# Patient Record
Sex: MALE | Race: OTHER | Employment: UNEMPLOYED | URBAN - METROPOLITAN AREA
[De-identification: names, ages, dates, MRNs, and addresses within clinical notes are randomized per-mention and may not be internally consistent; named-entity substitution may affect disease eponyms.]

---

## 2017-01-06 ENCOUNTER — OFFICE VISIT (OUTPATIENT)
Dept: PAIN MANAGEMENT | Age: 62
End: 2017-01-06

## 2017-01-06 VITALS
WEIGHT: 156 LBS | SYSTOLIC BLOOD PRESSURE: 121 MMHG | HEART RATE: 61 BPM | HEIGHT: 65 IN | DIASTOLIC BLOOD PRESSURE: 66 MMHG | BODY MASS INDEX: 25.99 KG/M2

## 2017-01-06 DIAGNOSIS — M25.50 PAIN IN JOINT, MULTIPLE SITES: Primary | ICD-10-CM

## 2017-01-06 DIAGNOSIS — M54.50 CHRONIC BILATERAL LOW BACK PAIN WITHOUT SCIATICA: ICD-10-CM

## 2017-01-06 DIAGNOSIS — G89.4 CHRONIC PAIN SYNDROME: ICD-10-CM

## 2017-01-06 DIAGNOSIS — G47.00 PERSISTENT DISORDER OF INITIATING OR MAINTAINING SLEEP: ICD-10-CM

## 2017-01-06 DIAGNOSIS — Z79.899 ENCOUNTER FOR LONG-TERM (CURRENT) USE OF HIGH-RISK MEDICATION: ICD-10-CM

## 2017-01-06 DIAGNOSIS — G89.29 CHRONIC BILATERAL LOW BACK PAIN WITHOUT SCIATICA: ICD-10-CM

## 2017-01-06 RX ORDER — MORPHINE SULFATE 30 MG/1
30 TABLET ORAL 4 TIMES DAILY
Qty: 120 TAB | Refills: 0 | Status: SHIPPED | OUTPATIENT
Start: 2017-02-17 | End: 2017-03-27 | Stop reason: SDUPTHER

## 2017-01-06 RX ORDER — FENTANYL 75 UG/H
1 PATCH TRANSDERMAL
Qty: 10 PATCH | Refills: 0 | Status: SHIPPED | OUTPATIENT
Start: 2017-02-17 | End: 2017-03-27 | Stop reason: SDUPTHER

## 2017-01-06 RX ORDER — FENTANYL 75 UG/H
1 PATCH TRANSDERMAL
Qty: 10 PATCH | Refills: 0 | Status: SHIPPED | OUTPATIENT
Start: 2017-03-18 | End: 2017-04-17

## 2017-01-06 RX ORDER — FENTANYL 75 UG/H
1 PATCH TRANSDERMAL
Qty: 10 PATCH | Refills: 0 | Status: SHIPPED | OUTPATIENT
Start: 2017-01-19 | End: 2017-03-27 | Stop reason: SDUPTHER

## 2017-01-06 RX ORDER — MORPHINE SULFATE 30 MG/1
30 TABLET ORAL
Qty: 120 TAB | Refills: 0 | Status: SHIPPED | OUTPATIENT
Start: 2017-03-18 | End: 2017-03-27 | Stop reason: SDUPTHER

## 2017-01-06 RX ORDER — MORPHINE SULFATE 30 MG/1
30 TABLET ORAL
Qty: 120 TAB | Refills: 0 | Status: SHIPPED | OUTPATIENT
Start: 2017-01-19 | End: 2017-03-27 | Stop reason: SDUPTHER

## 2017-01-06 NOTE — MR AVS SNAPSHOT
Visit Information Date & Time Provider Department Dept. Phone Encounter #  
 1/6/2017  8:20 AM Kelly Burks MD [address] [place] [address] for Pain Management [phone] Follow-up Instructions Return in about 3 months (around 4/6/2017). Your Appointments 3/27/2017 10:40 AM  
Follow Up with Kelly Bruks MD  
[address] for Pain Management 3651 [facility]) Appt Note: Return in about 3 months (around 4/6/2017). 30 [facility] [address] [phone] [facility] [id number] Upcoming Health Maintenance Date Due Hepatitis C Screening 1955 Pneumococcal 19-64 Medium Risk (1 of 1 - PPSV23) 1/29/1975 DTaP/Tdap/Td series (1 - Tdap) 1/29/1977 FOBT Q 1 YEAR AGE 50-75 1/29/2006 ZOSTER VACCINE AGE 60> 1/29/2016 INFLUENZA AGE 9 TO ADULT 8/1/2016 Allergies as of 1/6/2017  Review Complete On: 1/6/2017 By: Kelly Burks MD  
  
 Severity Noted Reaction Type Reactions Vicodin [Hydrocodone-acetaminophen]    Nausea and Vomiting Current Immunizations  Never Reviewed No immunizations on file. Not reviewed this visit You Were Diagnosed With   
  
 Codes Comments Pain in joint, multiple sites    -  Primary ICD-10-CM: M25.50 ICD-9-CM: 719.49 Chronic pain syndrome     ICD-10-CM: G89.4 ICD-9-CM: 338.4 Encounter for long-term (current) use of high-risk medication     ICD-10-CM: Z79.899 ICD-9-CM: V58.69 Chronic bilateral low back pain without sciatica     ICD-10-CM: M54.5, G89.29 ICD-9-CM: 724.2, 338.29 Persistent disorder of initiating or maintaining sleep     ICD-10-CM: G47.00 ICD-9-CM: 307.42 Vitals BP Pulse Height(growth percentile) Weight(growth percentile) BMI Smoking Status 121/66 61 5' 5\" (1.651 m) 156 lb (70.8 kg) 25.96 kg/m2 Current Every Day Smoker BMI and BSA Data Body Mass Index Body Surface Area 25.96 kg/m 2 1.8 m 2 Preferred Pharmacy Pharmacy Name Phone [phone], [place] - [address] [place] [address] [phone] Your Updated Medication List  
  
   
This list is accurate as of: 1/6/17  8:45 AM.  Always use your most recent med list.  
  
  
  
  
 ANDROGEL 1 % (25 mg/2.5gram) Glpk Generic drug:  testosterone 25 mg by TransDERmal route daily. cloNIDine HCl 0.1 mg tablet Commonly known as:  CATAPRES Take 1 Tab by mouth three (3) times daily. For anxiety, agitation, muscle aches, sweating, runny nose, and/or cramping. COMBIVENT  mcg/actuation inhaler Generic drug:  ipratropium-albuterol Take  by inhalation every six (6) hours as needed for Wheezing. CYMBALTA 60 mg capsule Generic drug:  DULoxetine Take 60 mg by mouth daily. ergocalciferol 50,000 unit capsule Commonly known as:  ERGOCALCIFEROL Take 1 Cap by mouth every seven (7) days. * fentaNYL 75 mcg/hr Commonly known as:  DURAGESIC  
1 Patch by TransDERmal route every seventy-two (72) hours for 30 days. Max Daily Amount: 1 Patch. For chronic pain. Mylan brand preferred  Indications: CHRONIC PAIN WITH OPIOID TOLERANCE, SEVERE PAIN WITH OPIOID TOLERANCE Start taking on:  1/19/2017 * fentaNYL 75 mcg/hr Commonly known as:  DURAGESIC  
1 Patch by TransDERmal route every seventy-two (72) hours for 30 days. Max Daily Amount: 1 Patch. For chronic pain. Mylan brand preferred  Indications: CHRONIC PAIN WITH OPIOID TOLERANCE, SEVERE PAIN WITH OPIOID TOLERANCE Start taking on:  2/17/2017 * fentaNYL 75 mcg/hr Commonly known as:  DURAGESIC  
1 Patch by TransDERmal route every seventy-two (72) hours for 30 days. Max Daily Amount: 1 Patch. Mylan brand preferred  Indications: CHRONIC PAIN WITH OPIOID TOLERANCE, SEVERE PAIN WITH OPIOID TOLERANCE Start taking on:  3/18/2017  
  
 frovatriptan 2.5 mg tablet Commonly known as:  [place] Take 1 Tab by mouth once as needed for Migraine for up to 1 dose. LORazepam 1 mg tablet Commonly known as:  ATIVAN Take 0.5-1 Tabs by mouth three (3) times daily as needed for Anxiety. Max Daily Amount: 3 mg. Indications: ANXIETY * morphine IR 30 mg tablet Commonly known as:  MS IR Take 1 Tab by mouth four (4) times daily as needed for Pain for up to 30 days. Max Daily Amount: 120 mg. Indications: PAIN, SEVERE PAIN Start taking on:  2017 * morphine IR 30 mg tablet Commonly known as:  MS IR Take 1 Tab by mouth four (4) times daily for 30 days. Indications: PAIN, SEVERE PAIN Start taking on:  2017 * morphine IR 30 mg tablet Commonly known as:  MS IR Take 1 Tab by mouth four (4) times daily as needed for Pain for up to 30 days. Max Daily Amount: 120 mg. Indications: PAIN, SEVERE PAIN Start taking on:  3/18/2017  
  
 promethazine 25 mg tablet Commonly known as:  PHENERGAN Take 1 Tab by mouth two (2) times a day. Prn nausea/vomiting PROTONIX 40 mg tablet Generic drug:  pantoprazole Take 40 mg by mouth daily. traZODone 150 mg tablet Commonly known as:  Jesús Eva Take 150 mg by mouth nightly. * Notice: This list has 6 medication(s) that are the same as other medications prescribed for you. Read the directions carefully, and ask your doctor or other care provider to review them with you. Prescriptions Printed Refills  
 fentaNYL (DURAGESIC) 75 mcg/hr 0 Starting on: 3/18/2017 Si Patch by TransDERmal route every seventy-two (72) hours for 30 days. Max Daily Amount: 1 Patch. Mylan brand preferred  Indications: CHRONIC PAIN WITH OPIOID TOLERANCE, SEVERE PAIN WITH OPIOID TOLERANCE Class: Print Route: TransDERmal  
 fentaNYL (DURAGESIC) 75 mcg/hr 0 Starting on: 2017  Si Patch by TransDERmal route every seventy-two (72) hours for 30 days. Max Daily Amount: 1 Patch. For chronic pain. Mylan brand preferred  Indications: CHRONIC PAIN WITH OPIOID TOLERANCE, SEVERE PAIN WITH OPIOID TOLERANCE Class: Print Route: TransDERmal  
 morphine IR (MS IR) 30 mg tablet 0 Starting on: 3/18/2017 Sig: Take 1 Tab by mouth four (4) times daily as needed for Pain for up to 30 days. Max Daily Amount: 120 mg. Indications: PAIN, SEVERE PAIN Class: Print Route: Oral  
 fentaNYL (DURAGESIC) 75 mcg/hr 0 Starting on: 2017 Si Patch by TransDERmal route every seventy-two (72) hours for 30 days. Max Daily Amount: 1 Patch. For chronic pain. Mylan brand preferred  Indications: CHRONIC PAIN WITH OPIOID TOLERANCE, SEVERE PAIN WITH OPIOID TOLERANCE Class: Print Route: TransDERmal  
 morphine IR (MS IR) 30 mg tablet 0 Starting on: 2017 Sig: Take 1 Tab by mouth four (4) times daily for 30 days. Indications: PAIN, SEVERE PAIN Class: Print Route: Oral  
 morphine IR (MS IR) 30 mg tablet 0 Starting on: 2017 Sig: Take 1 Tab by mouth four (4) times daily as needed for Pain for up to 30 days. Max Daily Amount: 120 mg. Indications: PAIN, SEVERE PAIN Class: Print Route: Oral  
  
Follow-up Instructions Return in about 3 months (around 2017). Patient Instructions Current health maintenance issues were reviewed and the patient was advised to followup with his/her PCP for completion of these items. Introducing [facility]! Talita Singh introduces [employer] patient portal. Now you can access parts of your medical record, email your doctor's office, and request medication refills online. 1. In your internet browser, go to [email or web address] 2. Click on the First Time User? Click Here link in the Sign In box. You will see the New Member Sign Up page. 3. Enter your [employer] Access Code exactly as it appears below.  You will not need to use this code after youve completed the sign-up process. If you do not sign up before the expiration date, you must request a new code. · [employer] Access Code: [email or web address] Expires: 1/8/2017  7:47 AM 
 
4. Enter the last four digits of your Social Security Number (xxxx) and Date of Birth (mm/dd/yyyy) as indicated and click Submit. You will be taken to the next sign-up page. 5. Create a [employer] ID. This will be your [employer] login ID and cannot be changed, so think of one that is secure and easy to remember. 6. Create a [employer] password. You can change your password at any time. 7. Enter your Password Reset Question and Answer. This can be used at a later time if you forget your password. 8. Enter your e-mail address. You will receive e-mail notification when new information is available in [address]. 9. Click Sign Up. You can now view and download portions of your medical record. 10. Click the Download Summary menu link to download a portable copy of your medical information. If you have questions, please visit the Frequently Asked Questions section of the [employer] website. Remember, [employer] is NOT to be used for urgent needs. For medical emergencies, dial 911. Now available from your iPhone and Android! Please provide this summary of care documentation to your next provider. Your primary care clinician is listed as Jackie Lopez. If you have any questions after today's visit, please call [phone].

## 2017-01-06 NOTE — PROGRESS NOTES
HISTORY OF PRESENT ILLNESS  Leticia Ramsay is a 62 y.o. male. HPI he returns for follow-up of chronic, severe pain which is widespread and polyarticular and related to generalized osteoarthritis. He also suffers from chronic low back pain related to underlying spondylosis and degenerative disc disease. Pain continues under excellent control, averaging 5 out of 10 with 50% overall relief. Pain level today 5 out of 10, outcome 9/28,(The lower the upper number, the better the outcome)  Physical activity mobility as well as mood are good, sleep is fair. No reported side effects. Review of Systems   Constitutional: Positive for malaise/fatigue. Negative for chills, fever and weight loss (gain). HENT: Negative for congestion, ear pain and sore throat. Eyes: Positive for blurred vision. Negative for double vision and pain. Respiratory: Positive for shortness of breath (COPD/no changes in 30 years/tolerable/not on oxygen). Negative for cough. Cardiovascular: Negative for chest pain and leg swelling. Gastrointestinal: Positive for abdominal pain, constipation, diarrhea, heartburn and nausea. Negative for blood in stool and vomiting. Genitourinary: Negative. Musculoskeletal: Positive for back pain, joint pain, myalgias and neck pain. Skin: Negative. Neurological: Positive for dizziness (occasional), weakness and headaches. Negative for tingling and seizures. Endo/Heme/Allergies: Negative for environmental allergies. Psychiatric/Behavioral: Positive for depression. Negative for suicidal ideas. The patient is nervous/anxious (occasional). The patient does not have insomnia. All other systems reviewed and are negative. Physical Exam   Constitutional: He is oriented to person, place, and time. He appears well-developed and well-nourished. No distress. HENT:   Head: Normocephalic.    Right Ear: External ear normal.   Left Ear: External ear normal.   Poor dentition   Eyes: Conjunctivae and EOM are normal. Pupils are equal, round, and reactive to light. Neck: Normal range of motion. No thyromegaly present. Painful ROM   Pulmonary/Chest: Effort normal. No respiratory distress. Musculoskeletal: He exhibits tenderness (throughout neck/back/hands/elbows/knees). He exhibits no edema. Right shoulder: He exhibits decreased range of motion, tenderness and pain. Left shoulder: He exhibits decreased range of motion, tenderness and pain. Right elbow: He exhibits normal range of motion (painful extension). Left elbow: He exhibits normal range of motion (painful extension). Right hip: He exhibits decreased range of motion (painful) and tenderness (over TB). Left hip: He exhibits decreased range of motion (painful) and tenderness (over TB). Right knee: He exhibits normal range of motion. Tenderness (crepitus) found. Left knee: He exhibits normal range of motion. Tenderness (crepitus) found. Right ankle: Tenderness. Left ankle: Tenderness. Cervical back: He exhibits tenderness, pain (ROM) and spasm. He exhibits normal range of motion. Thoracic back: He exhibits tenderness, pain and spasm. Lumbar back: He exhibits decreased range of motion, tenderness, pain and spasm. Back:    Arthritic changes hands  Tight musculature throughout back   Neurological: He is alert and oriented to person, place, and time. No cranial nerve deficit (grossly intact). Gait (antalgic) abnormal.   Skin: Skin is warm and dry. Psychiatric: He has a normal mood and affect. His behavior is normal. Judgment and thought content normal.   Nursing note and vitals reviewed. ASSESSMENT and PLAN  Encounter Diagnoses   Name Primary?     Pain in joint, multiple sites Yes    Chronic pain syndrome     Encounter for long-term (current) use of high-risk medication     Chronic bilateral low back pain without sciatica     Persistent disorder of initiating or maintaining sleep      He will continue on his current analgesic regimen as this is providing excellent pain control with improve functionality and minimal side effects. 3 month reassess him    No concerns are raised for misuse, abuse, or diversion. 1. Pain medications are prescribed with the objective of pain relief and improved physical and psychosocial function in this patient. 2. Counseled patient on proper use of prescribed medications and reviewed opioid contract. 3. Counseled patient about chronic medical conditions and their relationship to anxiety and depression and recommended mental health support as needed. 4. Reviewed with patient self-help tools, home exercise, and lifestyle changes to assist the patient in self-management of symptoms. 5. Advised patient to have a primary care provider to continue care for health maintenance and general medical conditions and support for referral to specialty care as needed. 6. Reviewed with patient the treatment plan, goals of treatment plan, and limitations of treatment plan, to include the potential for side effects from medications and procedures. If side effects occur, it is the responsibility of the patient to inform the clinic so that a change in the treatment plan can be made in a safe manner. The patient is advised that stopping prescribed medication may cause an increase in symptoms and possible medication withdrawal symptoms. The patient is informed an emergency room evaluation may be necessary if this occurs. DISPOSITION: The patients condition and plan were discussed at length and all questions were answered. The patient agrees with the plan.     Counseling occupied > 50% of visit:  Total time: 30 minutes

## 2017-01-06 NOTE — PROGRESS NOTES
Nursing Notes    Patient presents to the office today in follow-up. Patient rates his pain at 5/10 on the numerical pain scale. Reviewed medications with counts as follows:    Rx Date filled Qty Dispensed Pill Count Last Dose Short   Fentanyl 75 mcg/hr  12/21/16 10 5 Current patch on right arm per pt no   Morphine sulfate IR 30 mg 12/21/16 120 53 This a.m no                          POC UDS was not performed in office today    Any new labs or imaging since last appointment? NO    Have you been to an emergency room (ER) or urgent care clinic since your last visit? NO            Have you been hospitalized since your last visit? NO     If yes, where, when, and reason for visit? Have you seen or consulted any other health care providers outside of the [address] [place] [address]  since your last visit? NO     If yes, where, when, and reason for visit? HM deferred to pcp.

## 2017-03-27 ENCOUNTER — OFFICE VISIT (OUTPATIENT)
Dept: PAIN MANAGEMENT | Age: 62
End: 2017-03-27

## 2017-03-27 VITALS
HEART RATE: 66 BPM | BODY MASS INDEX: 26.29 KG/M2 | SYSTOLIC BLOOD PRESSURE: 164 MMHG | WEIGHT: 158 LBS | DIASTOLIC BLOOD PRESSURE: 85 MMHG

## 2017-03-27 DIAGNOSIS — M54.2 CERVICALGIA: ICD-10-CM

## 2017-03-27 DIAGNOSIS — G47.00 PERSISTENT DISORDER OF INITIATING OR MAINTAINING SLEEP: ICD-10-CM

## 2017-03-27 DIAGNOSIS — I73.9 INTERMITTENT CLAUDICATION (HCC): ICD-10-CM

## 2017-03-27 DIAGNOSIS — G89.29 CHRONIC BILATERAL LOW BACK PAIN WITHOUT SCIATICA: Primary | ICD-10-CM

## 2017-03-27 DIAGNOSIS — R51.9 HEADACHE, UNSPECIFIED HEADACHE TYPE: ICD-10-CM

## 2017-03-27 DIAGNOSIS — Z79.899 ENCOUNTER FOR LONG-TERM (CURRENT) USE OF HIGH-RISK MEDICATION: ICD-10-CM

## 2017-03-27 DIAGNOSIS — M54.50 CHRONIC BILATERAL LOW BACK PAIN WITHOUT SCIATICA: Primary | ICD-10-CM

## 2017-03-27 DIAGNOSIS — M25.50 PAIN IN JOINT, MULTIPLE SITES: ICD-10-CM

## 2017-03-27 DIAGNOSIS — G89.4 CHRONIC PAIN SYNDROME: ICD-10-CM

## 2017-03-27 LAB
ALCOHOL UR POC: NORMAL
AMPHETAMINES UR POC: NEGATIVE
BARBITURATES UR POC: NEGATIVE
BENZODIAZEPINES UR POC: NEGATIVE
BUPRENORPHINE UR POC: NORMAL
CANNABINOIDS UR POC: NEGATIVE
CARISOPRODOL UR POC: NORMAL
COCAINE UR POC: NEGATIVE
FENTANYL UR POC: NORMAL
MDMA/ECSTASY UR POC: NEGATIVE
METHADONE UR POC: NEGATIVE
METHAMPHETAMINE UR POC: NEGATIVE
METHYLPHENIDATE UR POC: NEGATIVE
OPIATES UR POC: NORMAL
OXYCODONE UR POC: NEGATIVE
PHENCYCLIDINE UR POC: NEGATIVE
PROPOXYPHENE UR POC: NORMAL
TRAMADOL UR POC: NORMAL
TRICYCLICS UR POC: NEGATIVE

## 2017-03-27 RX ORDER — NALOXONE HYDROCHLORIDE 4 MG/.1ML
4 SPRAY NASAL AS NEEDED
Qty: 1 BOX | Refills: 1 | Status: SHIPPED | OUTPATIENT
Start: 2017-03-27

## 2017-03-27 RX ORDER — MORPHINE SULFATE 30 MG/1
30 TABLET ORAL
Qty: 120 TAB | Refills: 0 | Status: SHIPPED | OUTPATIENT
Start: 2017-06-12 | End: 2017-06-23 | Stop reason: SINTOL

## 2017-03-27 RX ORDER — CILOSTAZOL 50 MG/1
50 TABLET ORAL
Qty: 60 TAB | Refills: 5 | Status: SHIPPED | OUTPATIENT
Start: 2017-03-27 | End: 2017-04-26

## 2017-03-27 RX ORDER — MORPHINE SULFATE 30 MG/1
30 TABLET ORAL 4 TIMES DAILY
Qty: 120 TAB | Refills: 0 | Status: SHIPPED | OUTPATIENT
Start: 2017-05-14 | End: 2017-06-23 | Stop reason: SDUPTHER

## 2017-03-27 RX ORDER — MORPHINE SULFATE 30 MG/1
30 TABLET ORAL
Qty: 120 TAB | Refills: 0 | Status: SHIPPED | OUTPATIENT
Start: 2017-04-16 | End: 2017-06-23 | Stop reason: SDUPTHER

## 2017-03-27 RX ORDER — FENTANYL 75 UG/H
1 PATCH TRANSDERMAL
Qty: 10 PATCH | Refills: 0 | Status: SHIPPED | OUTPATIENT
Start: 2017-05-14 | End: 2017-06-23 | Stop reason: SDUPTHER

## 2017-03-27 RX ORDER — FENTANYL 75 UG/H
1 PATCH TRANSDERMAL
Qty: 10 PATCH | Refills: 0 | Status: SHIPPED | OUTPATIENT
Start: 2017-04-16 | End: 2017-06-23 | Stop reason: SDUPTHER

## 2017-03-27 RX ORDER — FENTANYL 75 UG/H
1 PATCH TRANSDERMAL
Qty: 10 PATCH | Refills: 0 | Status: SHIPPED | OUTPATIENT
Start: 2017-06-12 | End: 2017-06-23 | Stop reason: SDUPTHER

## 2017-03-27 NOTE — PROGRESS NOTES
Nursing Notes    Patient presents to the office today in follow-up. Patient rates his pain at 5/10 on the numerical pain scale. Reviewed medications with counts as follows:    Rx Date filled Qty Dispensed Pill Count Last Dose Short   Fentanyl 75 3/18/17 10 7+1 on Placed 3/27/17 no   MSIR 30 3/18/17 120 82 3/27/17 no         Comments:     POC UDS was performed in office today    Any new labs or imaging since last appointment? YES, vascular testing on legs    Have you been to an emergency room (ER) or urgent care clinic since your last visit? NO            Have you been hospitalized since your last visit? NO     If yes, where, when, and reason for visit? Have you seen or consulted any other health care providers outside of the [facility]  since your last visit? YES     If yes, where, when, and reason for visit? Vascular surgeon    Mr. Sauceda Limb has a reminder for a \"due or due soon\" health maintenance. I have asked that he contact his primary care provider for follow-up on this health maintenance.

## 2017-03-27 NOTE — MR AVS SNAPSHOT
Visit Information Date & Time Provider Department Dept. Phone Encounter #  
 3/27/2017 10:40 AM Kelly Burks MD [address] [facility] 21 [phone] Follow-up Instructions Return in about 3 months (around 6/27/2017). Upcoming Health Maintenance Date Due Hepatitis C Screening 1955 Pneumococcal 19-64 Medium Risk (1 of 1 - PPSV23) 1/29/1975 DTaP/Tdap/Td series (1 - Tdap) 1/29/1977 FOBT Q 1 YEAR AGE 50-75 1/29/2006 ZOSTER VACCINE AGE 60> 1/29/2016 INFLUENZA AGE 9 TO ADULT 8/1/2016 Allergies as of 3/27/2017  Review Complete On: 1/6/2017 By: Kelly Burks MD  
  
 Severity Noted Reaction Type Reactions Vicodin [Hydrocodone-acetaminophen]    Nausea and Vomiting Current Immunizations  Never Reviewed No immunizations on file. Not reviewed this visit Vitals BP Pulse Weight(growth percentile) BMI Smoking Status 164/85 66 158 lb (71.7 kg) 26.29 kg/m2 Current Every Day Smoker Vitals History BMI and BSA Data Body Mass Index Body Surface Area  
 26.29 kg/m 2 1.81 m 2 Preferred Pharmacy Pharmacy Name Phone [phone], [place] - [address] [place] [address] [phone] Your Updated Medication List  
  
   
This list is accurate as of: 3/27/17 10:54 AM.  Always use your most recent med list.  
  
  
  
  
 ANDROGEL 1 % (25 mg/2.5gram) Glpk Generic drug:  testosterone 25 mg by TransDERmal route daily. cloNIDine HCl 0.1 mg tablet Commonly known as:  CATAPRES Take 1 Tab by mouth three (3) times daily. For anxiety, agitation, muscle aches, sweating, runny nose, and/or cramping. COMBIVENT  mcg/actuation inhaler Generic drug:  ipratropium-albuterol Take  by inhalation every six (6) hours as needed for Wheezing. CYMBALTA 60 mg capsule Generic drug:  DULoxetine Take 60 mg by mouth daily. ergocalciferol 50,000 unit capsule Commonly known as:  ERGOCALCIFEROL Take 1 Cap by mouth every seven (7) days. * fentaNYL 75 mcg/hr Commonly known as:  DURAGESIC  
1 Patch by TransDERmal route every seventy-two (72) hours for 30 days. Max Daily Amount: 1 Patch. Mylan brand preferred  Indications: CHRONIC PAIN WITH OPIOID TOLERANCE, SEVERE PAIN WITH OPIOID TOLERANCE  
  
 * fentaNYL 75 mcg/hr Commonly known as:  DURAGESIC  
1 Patch by TransDERmal route every seventy-two (72) hours for 30 days. Max Daily Amount: 1 Patch. Mylan brand preferred  Indications: Chronic Pain with Opioid Tolerance, SEVERE PAIN WITH OPIOID TOLERANCE Start taking on:  4/16/2017 * fentaNYL 75 mcg/hr Commonly known as:  DURAGESIC  
1 Patch by TransDERmal route every seventy-two (72) hours for 30 days. Max Daily Amount: 1 Patch. For chronic pain. Mylan brand preferred  Indications: Chronic Pain with Opioid Tolerance, SEVERE PAIN WITH OPIOID TOLERANCE Start taking on:  5/14/2017 * fentaNYL 75 mcg/hr Commonly known as:  DURAGESIC  
1 Patch by TransDERmal route every seventy-two (72) hours for 30 days. Max Daily Amount: 1 Patch. For chronic pain. Mylan brand preferred  Indications: Chronic Pain with Opioid Tolerance, SEVERE PAIN WITH OPIOID TOLERANCE Start taking on:  6/12/2017  
  
 frovatriptan 2.5 mg tablet Commonly known as:  Stephenie Hickory Take 1 Tab by mouth once as needed for Migraine for up to 1 dose. LORazepam 1 mg tablet Commonly known as:  ATIVAN Take 0.5-1 Tabs by mouth three (3) times daily as needed for Anxiety. Max Daily Amount: 3 mg. Indications: ANXIETY * morphine IR 30 mg tablet Commonly known as:  MS IR Take 1 Tab by mouth four (4) times daily as needed for Pain for up to 30 days. Max Daily Amount: 120 mg. Indications: Pain, Severe Pain Start taking on:  4/16/2017 * morphine IR 30 mg tablet Commonly known as:  MS IR  
 Take 1 Tab by mouth four (4) times daily for 30 days. Indications: Pain, Severe Pain Start taking on:  2017 * morphine IR 30 mg tablet Commonly known as:  MS IR Take 1 Tab by mouth four (4) times daily as needed for Pain for up to 30 days. Max Daily Amount: 120 mg. Indications: Pain, Severe Pain Start taking on:  2017  
  
 naloxone 4 mg/actuation Spry 4 mg by Nasal route as needed for up to 2 doses. Indications: OPIOID TOXICITY  
  
 promethazine 25 mg tablet Commonly known as:  PHENERGAN Take 1 Tab by mouth two (2) times a day. Prn nausea/vomiting PROTONIX 40 mg tablet Generic drug:  pantoprazole Take 40 mg by mouth daily. traZODone 150 mg tablet Commonly known as:  Kevin Handy Take 150 mg by mouth nightly. * Notice: This list has 7 medication(s) that are the same as other medications prescribed for you. Read the directions carefully, and ask your doctor or other care provider to review them with you. Prescriptions Printed Refills  
 morphine IR (MS IR) 30 mg tablet 0 Starting on: 2017 Sig: Take 1 Tab by mouth four (4) times daily as needed for Pain for up to 30 days. Max Daily Amount: 120 mg. Indications: Pain, Severe Pain Class: Print Route: Oral  
 fentaNYL (DURAGESIC) 75 mcg/hr 0 Starting on: 2017 Si Patch by TransDERmal route every seventy-two (72) hours for 30 days. Max Daily Amount: 1 Patch. For chronic pain. Mylan brand preferred  Indications: Chronic Pain with Opioid Tolerance, SEVERE PAIN WITH OPIOID TOLERANCE Class: Print Route: TransDERmal  
 fentaNYL (DURAGESIC) 75 mcg/hr 0 Starting on: 2017 Si Patch by TransDERmal route every seventy-two (72) hours for 30 days. Max Daily Amount: 1 Patch. For chronic pain. Mylan brand preferred  Indications: Chronic Pain with Opioid Tolerance, SEVERE PAIN WITH OPIOID TOLERANCE Class: Print  Route: TransDERmal  
 morphine IR (MS IR) 30 mg tablet 0 Starting on: 2017 Sig: Take 1 Tab by mouth four (4) times daily for 30 days. Indications: Pain, Severe Pain Class: Print Route: Oral  
 morphine IR (MS IR) 30 mg tablet 0 Starting on: 2017 Sig: Take 1 Tab by mouth four (4) times daily as needed for Pain for up to 30 days. Max Daily Amount: 120 mg. Indications: Pain, Severe Pain Class: Print Route: Oral  
 fentaNYL (DURAGESIC) 75 mcg/hr 0 Starting on: 2017 Si Patch by TransDERmal route every seventy-two (72) hours for 30 days. Max Daily Amount: 1 Patch. Mylan brand preferred  Indications: Chronic Pain with Opioid Tolerance, SEVERE PAIN WITH OPIOID TOLERANCE Class: Print Route: TransDERmal  
  
Prescriptions Sent to Pharmacy Refills  
 naloxone 4 mg/actuation spry 1 Si mg by Nasal route as needed for up to 2 doses. Indications: OPIOID TOXICITY Class: Normal  
 Pharmacy: [facility] [id number] #: [phone] Route: Nasal  
  
Follow-up Instructions Return in about 3 months (around 2017). Patient Instructions Current health maintenance issues were reviewed and the patient was advised to followup with his/her PCP for completion of these items. Introducing [facility]! [facility] introduces [employer] patient portal. Now you can access parts of your medical record, email your doctor's office, and request medication refills online. 1. In your internet browser, go to [email or web address] 2. Click on the First Time User? Click Here link in the Sign In box. You will see the New Member Sign Up page. 3. Enter your [employer] Access Code exactly as it appears below. You will not need to use this code after youve completed the sign-up process. If you do not sign up before the expiration date, you must request a new code.  
 
· [employer] Access Code: [email or web address] 
 Expires: 6/25/2017 10:54 AM 
 
4. Enter the last four digits of your Social Security Number (xxxx) and Date of Birth (mm/dd/yyyy) as indicated and click Submit. You will be taken to the next sign-up page. 5. Create a [employer] ID. This will be your [employer] login ID and cannot be changed, so think of one that is secure and easy to remember. 6. Create a [employer] password. You can change your password at any time. 7. Enter your Password Reset Question and Answer. This can be used at a later time if you forget your password. 8. Enter your e-mail address. You will receive e-mail notification when new information is available in [address] 9. Click Sign Up. You can now view and download portions of your medical record. 10. Click the Download Summary menu link to download a portable copy of your medical information. If you have questions, please visit the Frequently Asked Questions section of the [employer] website. Remember, [employer] is NOT to be used for urgent needs. For medical emergencies, dial 911. Now available from your iPhone and Android! Please provide this summary of care documentation to your next provider. Your primary care clinician is listed as Jackie Lopez. If you have any questions after today's visit, please call [phone].

## 2017-06-23 ENCOUNTER — OFFICE VISIT (OUTPATIENT)
Dept: PAIN MANAGEMENT | Age: 62
End: 2017-06-23

## 2017-06-23 VITALS
WEIGHT: 152 LBS | BODY MASS INDEX: 25.33 KG/M2 | HEIGHT: 65 IN | SYSTOLIC BLOOD PRESSURE: 152 MMHG | DIASTOLIC BLOOD PRESSURE: 78 MMHG | HEART RATE: 60 BPM

## 2017-06-23 DIAGNOSIS — Z79.899 ENCOUNTER FOR LONG-TERM (CURRENT) USE OF HIGH-RISK MEDICATION: ICD-10-CM

## 2017-06-23 DIAGNOSIS — M54.6 PAIN IN THORACIC SPINE: ICD-10-CM

## 2017-06-23 DIAGNOSIS — M25.50 PAIN IN JOINT, MULTIPLE SITES: Primary | ICD-10-CM

## 2017-06-23 DIAGNOSIS — G47.00 PERSISTENT DISORDER OF INITIATING OR MAINTAINING SLEEP: ICD-10-CM

## 2017-06-23 DIAGNOSIS — M54.2 CERVICALGIA: ICD-10-CM

## 2017-06-23 DIAGNOSIS — I73.9 INTERMITTENT CLAUDICATION (HCC): ICD-10-CM

## 2017-06-23 DIAGNOSIS — G89.29 CHRONIC BILATERAL LOW BACK PAIN WITHOUT SCIATICA: ICD-10-CM

## 2017-06-23 DIAGNOSIS — M54.50 CHRONIC BILATERAL LOW BACK PAIN WITHOUT SCIATICA: ICD-10-CM

## 2017-06-23 RX ORDER — FENTANYL 75 UG/H
1 PATCH TRANSDERMAL
Qty: 10 PATCH | Refills: 0 | Status: SHIPPED | OUTPATIENT
Start: 2017-07-10 | End: 2017-09-18 | Stop reason: SDUPTHER

## 2017-06-23 RX ORDER — MORPHINE SULFATE 30 MG/1
30 TABLET ORAL
Qty: 120 TAB | Refills: 0 | Status: SHIPPED | OUTPATIENT
Start: 2017-07-10 | End: 2017-09-18 | Stop reason: SDUPTHER

## 2017-06-23 RX ORDER — FENTANYL 75 UG/H
1 PATCH TRANSDERMAL
Qty: 10 PATCH | Refills: 0 | Status: SHIPPED | OUTPATIENT
Start: 2017-08-08 | End: 2017-09-18 | Stop reason: SDUPTHER

## 2017-06-23 RX ORDER — PENTOXIFYLLINE 400 MG/1
400 TABLET, EXTENDED RELEASE ORAL
Qty: 90 TAB | Refills: 5 | Status: SHIPPED | OUTPATIENT
Start: 2017-06-23 | End: 2017-07-23

## 2017-06-23 RX ORDER — MORPHINE SULFATE 30 MG/1
30 TABLET ORAL 4 TIMES DAILY
Qty: 120 TAB | Refills: 0 | Status: SHIPPED | OUTPATIENT
Start: 2017-09-06 | End: 2017-09-18 | Stop reason: SDUPTHER

## 2017-06-23 RX ORDER — MORPHINE SULFATE 30 MG/1
30 TABLET ORAL
Qty: 120 TAB | Refills: 0 | Status: SHIPPED | OUTPATIENT
Start: 2017-08-08 | End: 2017-09-18 | Stop reason: SDUPTHER

## 2017-06-23 RX ORDER — FENTANYL 75 UG/H
1 PATCH TRANSDERMAL
Qty: 10 PATCH | Refills: 0 | Status: SHIPPED | OUTPATIENT
Start: 2017-09-06 | End: 2017-09-18 | Stop reason: SDUPTHER

## 2017-06-23 NOTE — PROGRESS NOTES
Nursing Notes    Patient presents to the office today in follow-up. Patient rates his pain at 6/10 on the numerical pain scale. Reviewed medications with counts as follows:    Rx Date filled Qty Dispensed Pill Count Last Dose Short   Fentanyl 75 mcg/hr  06/12/17 10 6+1 on  Current patch on right arm no   Morphine sulfate IR 30 mg 06/12/17 120 75 today no                             POC UDS was not performed in office today. Any new labs or imaging since last appointment? NO    Have you been to an emergency room (ER) or urgent care clinic since your last visit? NO            Have you been hospitalized since your last visit? NO     If yes, where, when, and reason for visit? Have you seen or consulted any other health care providers outside of the [address] [place] [address]  since your last visit? NO     If yes, where, when, and reason for visit? HM deferred to pcp.

## 2017-06-23 NOTE — PROGRESS NOTES
HISTORY OF PRESENT ILLNESS  Josh Dickerson is a 65 y.o. male. HPI he returns for follow-up of chronic, severe pain which is widespread and polyarticular and related to generalized osteoarthritis. He also suffers from chronic low back pain related to underlying spondylosis and degenerative disc disease, as well as chronic, daily headaches. Since last seen, he was begun on Pletal but did not tolerate this medication. This was discontinued and a trial of Trental will be initiated to help his claudicant symptoms. Pain continues under good control, averaging 5 out of 10 with 60% overall relief. Pain level today 6 out of 10, outcome 10/28,(The lower the upper number, the better the outcome)  Physical activity and mobility as well as sleep are good, mood is fair. No reported side effects. A current review of the  does not identify any inconsistency. Review of Systems   Constitutional: Positive for malaise/fatigue. Negative for chills, fever and weight loss (gain). HENT: Negative for congestion, ear pain and sore throat. Eyes: Positive for blurred vision. Negative for double vision and pain. Respiratory: Positive for shortness of breath (COPD/no changes in 30 years/tolerable/not on oxygen). Negative for cough. Cardiovascular: Negative for chest pain and leg swelling. Gastrointestinal: Positive for abdominal pain, constipation, diarrhea, heartburn and nausea. Negative for blood in stool and vomiting. Genitourinary: Negative. Musculoskeletal: Positive for back pain, joint pain, myalgias and neck pain. Skin: Negative. Neurological: Positive for dizziness (occasional), weakness and headaches. Negative for tingling and seizures. Endo/Heme/Allergies: Negative for environmental allergies. Psychiatric/Behavioral: Positive for depression. Negative for suicidal ideas. The patient is nervous/anxious (occasional). The patient does not have insomnia.     All other systems reviewed and are negative. Physical Exam   Constitutional: He is oriented to person, place, and time. He appears well-developed and well-nourished. No distress. HENT:   Head: Normocephalic. Right Ear: External ear normal.   Left Ear: External ear normal.   Poor dentition   Eyes: Conjunctivae and EOM are normal. Pupils are equal, round, and reactive to light. Neck: Normal range of motion. No thyromegaly present. Painful ROM   Pulmonary/Chest: Effort normal. No respiratory distress. Musculoskeletal: He exhibits tenderness (throughout neck/back/hands/elbows/knees). He exhibits no edema. Right shoulder: He exhibits decreased range of motion, tenderness and pain. Left shoulder: He exhibits decreased range of motion, tenderness and pain. Right elbow: He exhibits normal range of motion (painful extension). Left elbow: He exhibits normal range of motion (painful extension). Right hip: He exhibits decreased range of motion (painful) and tenderness (over TB). Left hip: He exhibits decreased range of motion (painful) and tenderness (over TB). Right knee: He exhibits normal range of motion. Tenderness (crepitus) found. Left knee: He exhibits normal range of motion. Tenderness (crepitus) found. Right ankle: Tenderness. Left ankle: Tenderness. Cervical back: He exhibits tenderness, pain (ROM) and spasm. He exhibits normal range of motion. Thoracic back: He exhibits tenderness, pain and spasm. Lumbar back: He exhibits decreased range of motion, tenderness, pain and spasm. Back:    Arthritic changes hands  Tight musculature throughout back   Neurological: He is alert and oriented to person, place, and time. No cranial nerve deficit (grossly intact). Gait (antalgic) abnormal.   Skin: Skin is warm and dry. Psychiatric: He has a normal mood and affect.  His behavior is normal. Judgment and thought content normal.   Nursing note and vitals reviewed. ASSESSMENT and PLAN  Encounter Diagnoses   Name Primary?  Pain in joint, multiple sites Yes    Persistent disorder of initiating or maintaining sleep     Chronic bilateral low back pain without sciatica     Cervicalgia     Pain in thoracic spine     Encounter for long-term (current) use of high-risk medication     Intermittent claudication ([address]      Treatment plan as noted above. He will continue on his current analgesic regimen as this is providing excellent pain control with improve functionality and minimal side effects. 3 month reassess him    No concerns are raised for misuse, abuse, or diversion. 1. Pain medications are prescribed with the objective of pain relief and improved physical and psychosocial function in this patient. 2. Counseled patient on proper use of prescribed medications and reviewed opioid contract. 3. Counseled patient about chronic medical conditions and their relationship to anxiety and depression and recommended mental health support as needed. 4. Reviewed with patient self-help tools, home exercise, and lifestyle changes to assist the patient in self-management of symptoms. 5. Advised patient to have a primary care provider to continue care for health maintenance and general medical conditions and support for referral to specialty care as needed. 6. Reviewed with patient the treatment plan, goals of treatment plan, and limitations of treatment plan, to include the potential for side effects from medications and procedures. If side effects occur, it is the responsibility of the patient to inform the clinic so that a change in the treatment plan can be made in a safe manner. The patient is advised that stopping prescribed medication may cause an increase in symptoms and possible medication withdrawal symptoms. The patient is informed an emergency room evaluation may be necessary if this occurs.   DISPOSITION: The patients condition and plan were discussed at length and all questions were answered. The patient agrees with the plan.     Counseling occupied > 50% of visit:  Total time: 30 minutes

## 2017-06-23 NOTE — MR AVS SNAPSHOT
Visit Information Date & Time Provider Department Dept. Phone Encounter #  
 6/23/2017  8:20 AM Steph Esquivel MD [facility] [phone] Follow-up Instructions Return in about 3 months (around 9/23/2017). Upcoming Health Maintenance Date Due Hepatitis C Screening 1955 Pneumococcal 19-64 Medium Risk (1 of 1 - PPSV23) 1/29/1975 DTaP/Tdap/Td series (1 - Tdap) 1/29/1977 FOBT Q 1 YEAR AGE 50-75 1/29/2006 ZOSTER VACCINE AGE 60> 1/29/2016 INFLUENZA AGE 9 TO ADULT 8/1/2017 Allergies as of 6/23/2017  Review Complete On: 6/23/2017 By: Any Lyons LPN Severity Noted Reaction Type Reactions Vicodin [Hydrocodone-acetaminophen]    Nausea and Vomiting Current Immunizations  Never Reviewed No immunizations on file. Not reviewed this visit Vitals BP Pulse Height(growth percentile) Weight(growth percentile) BMI Smoking Status 152/78 60 5' 5\" (1.651 m) 152 lb (68.9 kg) 25.29 kg/m2 Current Every Day Smoker BMI and BSA Data Body Mass Index Body Surface Area  
 25.29 kg/m 2 1.78 m 2 Preferred Pharmacy Pharmacy Name Phone [phone], [place] - [address] [place] [address] [phone] Your Updated Medication List  
  
   
This list is accurate as of: 6/23/17  8:32 AM.  Always use your most recent med list.  
  
  
  
  
 ANDROGEL 1 % (25 mg/2.5gram) Glpk Generic drug:  testosterone 25 mg by TransDERmal route daily. cloNIDine HCl 0.1 mg tablet Commonly known as:  CATAPRES Take 1 Tab by mouth three (3) times daily. For anxiety, agitation, muscle aches, sweating, runny nose, and/or cramping. COMBIVENT  mcg/actuation inhaler Generic drug:  ipratropium-albuterol Take  by inhalation every six (6) hours as needed for Wheezing. CYMBALTA 60 mg capsule Generic drug:  DULoxetine Take 60 mg by mouth daily. ergocalciferol 50,000 unit capsule Commonly known as:  ERGOCALCIFEROL Take 1 Cap by mouth every seven (7) days. * fentaNYL 75 mcg/hr Commonly known as:  DURAGESIC  
1 Patch by TransDERmal route every seventy-two (72) hours for 30 days. Max Daily Amount: 1 Patch. Mylan brand preferred  Indications: Chronic Pain with Opioid Tolerance, SEVERE PAIN WITH OPIOID TOLERANCE Start taking on:  7/10/2017 * fentaNYL 75 mcg/hr Commonly known as:  DURAGESIC  
1 Patch by TransDERmal route every seventy-two (72) hours for 30 days. Max Daily Amount: 1 Patch. For chronic pain. Mylan brand preferred  Indications: Chronic Pain with Opioid Tolerance, SEVERE PAIN WITH OPIOID TOLERANCE Start taking on:  8/8/2017 * fentaNYL 75 mcg/hr Commonly known as:  DURAGESIC  
1 Patch by TransDERmal route every seventy-two (72) hours for 30 days. Max Daily Amount: 1 Patch. For chronic pain. Mylan brand preferred  Indications: Chronic Pain with Opioid Tolerance, SEVERE PAIN WITH OPIOID TOLERANCE Start taking on:  9/6/2017  
  
 frovatriptan 2.5 mg tablet Commonly known as:  Litzy Malgorzata Take 1 Tab by mouth once as needed for Migraine for up to 1 dose. LORazepam 1 mg tablet Commonly known as:  ATIVAN Take 0.5-1 Tabs by mouth three (3) times daily as needed for Anxiety. Max Daily Amount: 3 mg. Indications: ANXIETY * morphine IR 30 mg tablet Commonly known as:  MS IR Take 1 Tab by mouth four (4) times daily as needed for Pain for up to 30 days. Max Daily Amount: 120 mg. Indications: Pain, Severe Pain Start taking on:  7/10/2017 * morphine IR 30 mg tablet Commonly known as:  MS IR Take 1 Tab by mouth four (4) times daily as needed for Pain for up to 30 days. Max Daily Amount: 120 mg. Indications: Pain, Severe Pain Start taking on:  8/8/2017 * morphine IR 30 mg tablet Commonly known as:  MS IR Take 1 Tab by mouth four (4) times daily for 30 days. Indications: Pain, Severe Pain Start taking on:  2017  
  
 naloxone 4 mg/actuation Spry 4 mg by Nasal route as needed for up to 2 doses. Indications: OPIOID TOXICITY  
  
 pentoxifylline  mg CR tablet Commonly known as:  TRENTAL Take 1 Tab by mouth three (3) times daily (with meals) for 30 days. Indications: INTERMITTENT CLAUDICATION  
  
 promethazine 25 mg tablet Commonly known as:  PHENERGAN Take 1 Tab by mouth two (2) times a day. Prn nausea/vomiting PROTONIX 40 mg tablet Generic drug:  pantoprazole Take 40 mg by mouth daily. traZODone 150 mg tablet Commonly known as:  Arabellaarturo Rump Take 150 mg by mouth nightly. * Notice: This list has 6 medication(s) that are the same as other medications prescribed for you. Read the directions carefully, and ask your doctor or other care provider to review them with you. Prescriptions Printed Refills  
 fentaNYL (DURAGESIC) 75 mcg/hr 0 Starting on: 2017 Si Patch by TransDERmal route every seventy-two (72) hours for 30 days. Max Daily Amount: 1 Patch. For chronic pain. Mylan brand preferred  Indications: Chronic Pain with Opioid Tolerance, SEVERE PAIN WITH OPIOID TOLERANCE Class: Print Route: TransDERmal  
 fentaNYL (DURAGESIC) 75 mcg/hr 0 Starting on: 2017 Si Patch by TransDERmal route every seventy-two (72) hours for 30 days. Max Daily Amount: 1 Patch. For chronic pain. Mylan brand preferred  Indications: Chronic Pain with Opioid Tolerance, SEVERE PAIN WITH OPIOID TOLERANCE Class: Print Route: TransDERmal  
 morphine IR (MS IR) 30 mg tablet 0 Starting on: 2017 Sig: Take 1 Tab by mouth four (4) times daily for 30 days. Indications: Pain, Severe Pain Class: Print Route: Oral  
 morphine IR (MS IR) 30 mg tablet 0 Starting on: 2017 Sig: Take 1 Tab by mouth four (4) times daily as needed for Pain for up to 30 days. Max Daily Amount: 120 mg. Indications: Pain, Severe Pain Class: Print Route: Oral  
 fentaNYL (DURAGESIC) 75 mcg/hr 0 Starting on: 7/10/2017 Si Patch by TransDERmal route every seventy-two (72) hours for 30 days. Max Daily Amount: 1 Patch. Mylan brand preferred  Indications: Chronic Pain with Opioid Tolerance, SEVERE PAIN WITH OPIOID TOLERANCE Class: Print Route: TransDERmal  
 morphine IR (MS IR) 30 mg tablet 0 Starting on: 7/10/2017 Sig: Take 1 Tab by mouth four (4) times daily as needed for Pain for up to 30 days. Max Daily Amount: 120 mg. Indications: Pain, Severe Pain Class: Print Route: Oral  
  
Prescriptions Sent to Pharmacy Refills  
 pentoxifylline CR (TRENTAL) 400 mg CR tablet 5 Sig: Take 1 Tab by mouth three (3) times daily (with meals) for 30 days. Indications: INTERMITTENT CLAUDICATION Class: Normal  
 Pharmacy: [id number] [address], [address] #: [phone] Route: Oral  
  
Follow-up Instructions Return in about 3 months (around 2017). Patient Instructions Current health maintenance issues were reviewed and the patient was advised to followup with his/her PCP for completion of these items. Introducing [facility]! [facility] introduces [employer] patient portal. Now you can access parts of your medical record, email your doctor's office, and request medication refills online. 1. In your internet browser, go to [email or web address] 2. Click on the First Time User? Click Here link in the Sign In box. You will see the New Member Sign Up page. 3. Enter your [employer] Access Code exactly as it appears below. You will not need to use this code after youve completed the sign-up process. If you do not sign up before the expiration date, you must request a new code. · [employer] Access Code: [email or web address] Expires: 2017 10:54 AM 
 
4.  Enter the last four digits of your Social Security Number (xxxx) and Date of Birth (mm/dd/yyyy) as indicated and click Submit. You will be taken to the next sign-up page. 5. Create a [employer] ID. This will be your [employer] login ID and cannot be changed, so think of one that is secure and easy to remember. 6. Create a [employer] password. You can change your password at any time. 7. Enter your Password Reset Question and Answer. This can be used at a later time if you forget your password. 8. Enter your e-mail address. You will receive e-mail notification when new information is available in [address] 9. Click Sign Up. You can now view and download portions of your medical record. 10. Click the Download Summary menu link to download a portable copy of your medical information. If you have questions, please visit the Frequently Asked Questions section of the [employer] website. Remember, [employer] is NOT to be used for urgent needs. For medical emergencies, dial 911. Now available from your iPhone and Android! Please provide this summary of care documentation to your next provider. Your primary care clinician is listed as Jackie Lopez. If you have any questions after today's visit, please call [phone].

## 2017-09-18 ENCOUNTER — OFFICE VISIT (OUTPATIENT)
Dept: PAIN MANAGEMENT | Age: 62
End: 2017-09-18

## 2017-09-18 VITALS
TEMPERATURE: 97 F | HEIGHT: 65 IN | RESPIRATION RATE: 18 BRPM | HEART RATE: 54 BPM | DIASTOLIC BLOOD PRESSURE: 71 MMHG | SYSTOLIC BLOOD PRESSURE: 121 MMHG

## 2017-09-18 DIAGNOSIS — Z79.899 ENCOUNTER FOR LONG-TERM (CURRENT) USE OF HIGH-RISK MEDICATION: Primary | ICD-10-CM

## 2017-09-18 DIAGNOSIS — G89.29 CHRONIC BILATERAL LOW BACK PAIN WITHOUT SCIATICA: ICD-10-CM

## 2017-09-18 DIAGNOSIS — M54.50 CHRONIC BILATERAL LOW BACK PAIN WITHOUT SCIATICA: ICD-10-CM

## 2017-09-18 DIAGNOSIS — M54.2 CERVICALGIA: ICD-10-CM

## 2017-09-18 DIAGNOSIS — G89.4 CHRONIC PAIN SYNDROME: ICD-10-CM

## 2017-09-18 DIAGNOSIS — M25.50 PAIN IN JOINT, MULTIPLE SITES: ICD-10-CM

## 2017-09-18 RX ORDER — FENTANYL 75 UG/H
1 PATCH TRANSDERMAL
Qty: 10 PATCH | Refills: 0 | Status: SHIPPED | OUTPATIENT
Start: 2017-12-03 | End: 2017-12-11 | Stop reason: SDUPTHER

## 2017-09-18 RX ORDER — FENTANYL 75 UG/H
1 PATCH TRANSDERMAL
Qty: 10 PATCH | Refills: 0 | Status: SHIPPED | OUTPATIENT
Start: 2017-10-05 | End: 2017-12-11 | Stop reason: SDUPTHER

## 2017-09-18 RX ORDER — GABAPENTIN 300 MG/1
300 CAPSULE ORAL 3 TIMES DAILY
Qty: 90 CAP | Refills: 2 | Status: SHIPPED | OUTPATIENT
Start: 2017-09-18 | End: 2018-06-18 | Stop reason: SDUPTHER

## 2017-09-18 RX ORDER — FENTANYL 75 UG/H
1 PATCH TRANSDERMAL
Qty: 10 PATCH | Refills: 0 | Status: SHIPPED | OUTPATIENT
Start: 2017-11-04 | End: 2017-12-11 | Stop reason: SDUPTHER

## 2017-09-18 RX ORDER — MORPHINE SULFATE 30 MG/1
30 TABLET ORAL
Qty: 120 TAB | Refills: 0 | Status: SHIPPED | OUTPATIENT
Start: 2017-12-03 | End: 2017-12-11 | Stop reason: SDUPTHER

## 2017-09-18 RX ORDER — MORPHINE SULFATE 30 MG/1
30 TABLET ORAL 4 TIMES DAILY
Qty: 120 TAB | Refills: 0 | Status: SHIPPED | OUTPATIENT
Start: 2017-10-05 | End: 2017-12-11 | Stop reason: SDUPTHER

## 2017-09-18 RX ORDER — MORPHINE SULFATE 30 MG/1
30 TABLET ORAL
Qty: 120 TAB | Refills: 0 | Status: SHIPPED | OUTPATIENT
Start: 2017-11-04 | End: 2017-12-11 | Stop reason: SDUPTHER

## 2017-09-18 NOTE — ACP (ADVANCE CARE PLANNING)
The pt does not have an advanced medical directive, POA, or living will. The pt is not interested in discussing this today.

## 2017-09-18 NOTE — MR AVS SNAPSHOT
Visit Information Date & Time Provider Department Dept. Phone Encounter #  
 9/18/2017  8:00 AM Jose Nagy, [facility] [address] [facility] [phone] [id number] Follow-up Instructions Return in about 3 months (around 12/18/2017). Upcoming Health Maintenance Date Due Hepatitis C Screening 1955 Pneumococcal 19-64 Medium Risk (1 of 1 - PPSV23) 1/29/1975 DTaP/Tdap/Td series (1 - Tdap) 1/29/1977 FOBT Q 1 YEAR AGE 50-75 1/29/2006 ZOSTER VACCINE AGE 60> 11/29/2015 INFLUENZA AGE 9 TO ADULT 8/1/2017 Allergies as of 9/18/2017  Review Complete On: 9/18/2017 By: CATALINA Chen Severity Noted Reaction Type Reactions Vicodin [Hydrocodone-acetaminophen]    Nausea and Vomiting Current Immunizations  Never Reviewed No immunizations on file. Not reviewed this visit You Were Diagnosed With   
  
 Codes Comments Chronic bilateral low back pain without sciatica     ICD-10-CM: M54.5, G89.29 ICD-9-CM: 724.2, 338.29 Cervicalgia     ICD-10-CM: M54.2 ICD-9-CM: 723.1 Pain in joint, multiple sites     ICD-10-CM: M25.50 ICD-9-CM: 719.49 Chronic pain syndrome     ICD-10-CM: G89.4 ICD-9-CM: 338. 4 Vitals BP Pulse Temp Resp Height(growth percentile) Smoking Status 121/71 (!) 54 97 °F (36.1 °C) 18 5' 5\" (1.651 m) Current Every Day Smoker Preferred Pharmacy Pharmacy Name Phone [id number] [address], [address] [phone] Your Updated Medication List  
  
   
This list is accurate as of: 9/18/17  8:25 AM.  Always use your most recent med list.  
  
  
  
  
 ANDROGEL 1 % (25 mg/2.5gram) Glpk Generic drug:  testosterone 25 mg by TransDERmal route daily. cloNIDine HCl 0.1 mg tablet Commonly known as:  CATAPRES Take 1 Tab by mouth three (3) times daily. For anxiety, agitation, muscle aches, sweating, runny nose, and/or cramping. COMBIVENT  mcg/actuation inhaler Generic drug:  ipratropium-albuterol Take  by inhalation every six (6) hours as needed for Wheezing. CYMBALTA 60 mg capsule Generic drug:  DULoxetine Take 60 mg by mouth daily. ergocalciferol 50,000 unit capsule Commonly known as:  ERGOCALCIFEROL Take 1 Cap by mouth every seven (7) days. * fentaNYL 75 mcg/hr Commonly known as:  DURAGESIC  
1 Patch by TransDERmal route every seventy-two (72) hours for 30 days. Max Daily Amount: 1 Patch. For chronic pain. Mylan brand preferred  Indications: Chronic Pain with Opioid Tolerance, SEVERE PAIN WITH OPIOID TOLERANCE Start taking on:  10/5/2017 * fentaNYL 75 mcg/hr Commonly known as:  DURAGESIC  
1 Patch by TransDERmal route every seventy-two (72) hours for 30 days. Max Daily Amount: 1 Patch. For chronic pain. Mylan brand preferred  Indications: Chronic Pain with Opioid Tolerance, SEVERE PAIN WITH OPIOID TOLERANCE Start taking on:  11/4/2017 * fentaNYL 75 mcg/hr Commonly known as:  DURAGESIC  
1 Patch by TransDERmal route every seventy-two (72) hours for 30 days. Max Daily Amount: 1 Patch. Mylan brand preferred  Indications: Chronic Pain with Opioid Tolerance, SEVERE PAIN WITH OPIOID TOLERANCE Start taking on:  12/3/2017  
  
 frovatriptan 2.5 mg tablet Commonly known as:  Larayne Slay Take 1 Tab by mouth once as needed for Migraine for up to 1 dose. gabapentin 300 mg capsule Commonly known as:  NEURONTIN Take 1 Cap by mouth three (3) times daily for 30 days. LORazepam 1 mg tablet Commonly known as:  ATIVAN Take 0.5-1 Tabs by mouth three (3) times daily as needed for Anxiety. Max Daily Amount: 3 mg. Indications: ANXIETY * morphine IR 30 mg tablet Commonly known as:  MS IR Take 1 Tab by mouth four (4) times daily for 30 days. Indications: Pain, Severe Pain Start taking on:  10/5/2017 * morphine IR 30 mg tablet Commonly known as:  MS IR  
 Take 1 Tab by mouth four (4) times daily as needed for Pain for up to 30 days. Max Daily Amount: 120 mg. Indications: Pain, Severe Pain Start taking on:  2017 * morphine IR 30 mg tablet Commonly known as:  MS IR Take 1 Tab by mouth four (4) times daily as needed for Pain for up to 30 days. Max Daily Amount: 120 mg. Indications: Pain, Severe Pain Start taking on:  12/3/2017  
  
 naloxone 4 mg/actuation nasal spray Commonly known as:  NARCAN  
4 mg by Nasal route as needed for up to 2 doses. Indications: OPIOID TOXICITY  
  
 promethazine 25 mg tablet Commonly known as:  PHENERGAN Take 1 Tab by mouth two (2) times a day. Prn nausea/vomiting PROTONIX 40 mg tablet Generic drug:  pantoprazole Take 40 mg by mouth daily. traZODone 150 mg tablet Commonly known as:  Angie Dart Take 150 mg by mouth nightly. * Notice: This list has 6 medication(s) that are the same as other medications prescribed for you. Read the directions carefully, and ask your doctor or other care provider to review them with you. Prescriptions Printed Refills  
 fentaNYL (DURAGESIC) 75 mcg/hr 0 Starting on: 10/5/2017 Si Patch by TransDERmal route every seventy-two (72) hours for 30 days. Max Daily Amount: 1 Patch. For chronic pain. Mylan brand preferred  Indications: Chronic Pain with Opioid Tolerance, SEVERE PAIN WITH OPIOID TOLERANCE Class: Print Route: TransDERmal  
 fentaNYL (DURAGESIC) 75 mcg/hr 0 Starting on: 2017 Si Patch by TransDERmal route every seventy-two (72) hours for 30 days. Max Daily Amount: 1 Patch. For chronic pain. Mylan brand preferred  Indications: Chronic Pain with Opioid Tolerance, SEVERE PAIN WITH OPIOID TOLERANCE Class: Print Route: TransDERmal  
 fentaNYL (DURAGESIC) 75 mcg/hr 0 Starting on: 12/3/2017  Si Patch by TransDERmal route every seventy-two (72) hours for 30 days. Max Daily Amount: 1 Patch. Mylan brand preferred  Indications: Chronic Pain with Opioid Tolerance, SEVERE PAIN WITH OPIOID TOLERANCE Class: Print Route: TransDERmal  
 morphine IR (MS IR) 30 mg tablet 0 Starting on: 10/5/2017 Sig: Take 1 Tab by mouth four (4) times daily for 30 days. Indications: Pain, Severe Pain Class: Print Route: Oral  
 morphine IR (MS IR) 30 mg tablet 0 Starting on: 11/4/2017 Sig: Take 1 Tab by mouth four (4) times daily as needed for Pain for up to 30 days. Max Daily Amount: 120 mg. Indications: Pain, Severe Pain Class: Print Route: Oral  
 morphine IR (MS IR) 30 mg tablet 0 Starting on: 12/3/2017 Sig: Take 1 Tab by mouth four (4) times daily as needed for Pain for up to 30 days. Max Daily Amount: 120 mg. Indications: Pain, Severe Pain Class: Print Route: Oral  
  
Prescriptions Sent to Pharmacy Refills  
 gabapentin (NEURONTIN) 300 mg capsule 2 Sig: Take 1 Cap by mouth three (3) times daily for 30 days. Class: Normal  
 Pharmacy: [facility] [address] #: [phone] Route: Oral  
  
Follow-up Instructions Return in about 3 months (around 12/18/2017). Patient Instructions 1. Continue current plan with no evidence of addiction or diversion. Stable on current medication without adverse events. 2. Refill Fentanyl 75 mcg every 72 hours 3. Refill MSIR 30 mg up to 4 times per day for severe pain 4. Add gabapentin 300 mg. Start 1 tablet once nightly ×1 week, then every 12 hours ×1 week, then every 8 hours thereafter. 2 refills. 5. Please follow-up with PCP and/or neurologist regarding migraine headaches 6. Discussed risks of addiction, dependency, and opioid induced hyperalgesia. 7. Return to clinic in 3 months Introducing [address]!    
 [facility] introduces [employer] patient portal. Now you can access parts of your medical record, email your doctor's office, and request medication refills online. 1. In your internet browser, go to [email or web address] 2. Click on the First Time User? Click Here link in the Sign In box. You will see the New Member Sign Up page. 3. Enter your [employer] Access Code exactly as it appears below. You will not need to use this code after youve completed the sign-up process. If you do not sign up before the expiration date, you must request a new code. · [employer] Access Code: [email or web address] Expires: 12/17/2017  8:25 AM 
 
4. Enter the last four digits of your Social Security Number (xxxx) and Date of Birth (mm/dd/yyyy) as indicated and click Submit. You will be taken to the next sign-up page. 5. Create a [employer] ID. This will be your [employer] login ID and cannot be changed, so think of one that is secure and easy to remember. 6. Create a [employer] password. You can change your password at any time. 7. Enter your Password Reset Question and Answer. This can be used at a later time if you forget your password. 8. Enter your e-mail address. You will receive e-mail notification when new information is available in [address] 9. Click Sign Up. You can now view and download portions of your medical record. 10. Click the Download Summary menu link to download a portable copy of your medical information. If you have questions, please visit the Frequently Asked Questions section of the [employer] website. Remember, [employer] is NOT to be used for urgent needs. For medical emergencies, dial 911. Now available from your iPhone and Android! Please provide this summary of care documentation to your next provider. Your primary care clinician is listed as Jackie Lopez. If you have any questions after today's visit, please call [phone].

## 2017-09-18 NOTE — PROGRESS NOTES
HISTORY OF PRESENT ILLNESS  Nikki Cunningham is a 65 y.o. male    HPI: Mr. Marisa Cervantes  returns today for f/u of chronic severe pain which is widespread and polyarticular related to generalized osteoarthritis. In addition, he suffers from chronic low back pain and episodic migraine headaches. He is here today with his wife. He continues to do well with his current treatment plan which has been offering significant pain control. We discussed his current condition and medications in detail. He does report some continued radiating pains in his left lower extremity. He has been to a vascular surgeon who has agreed that this pain is most likely coming from his low back. I have recommended that we try gabapentin to start with. We discussed tapering plan. I also discussed getting an EMG as well. He says report that he has had an EMG in the past, however I do not have the report available for my review today. He has been seen for chronic migraines in the past.  Unfortunately Dr. Ap Barber is leaving our practice. He will continue to follow-up with his PCP for further evaluation and recommendation regarding his migraines. He is otherwise doing well with no other complaints today. I will have him follow-up in 3 months for further evaluation and recommendation. Medication management consists of Fentanyl 75 mcg patch q 72 hrs and Morphine 30 mg QID PRN. Medications are helping with pain control and quality of life. His pain is 5-6/10 with medication and 10/10 without. Pt describes pain as constant, tender, and aching. Aggravating factors include most activity. Relieved with rest, medication, and avoiding painful activities. Current treatment is helping to improve general activity, mood, walking, sleep, enjoyment of life    He  is otherwise doing well with no other complaints today. He denies any current adverse events including nausea, vomiting, dizziness, constipation, hallucinations, or seizures.      PRIOR IMAGIN. Lumbar MRI 2011: L5-S1 DDD with only narrowed central canal stenosis. Mild to moderate left and mild right foraminal narrowing       Allergies   Allergen Reactions    Vicodin [Hydrocodone-Acetaminophen] Nausea and Vomiting       Past Surgical History:   Procedure Laterality Date    HX ADENOIDECTOMY      HX CHOLECYSTECTOMY      HX TONSILLECTOMY      ORAL SURGERY PROCEDURE           Review of Systems   Constitutional: Positive for malaise/fatigue. Negative for chills, fever and weight loss. HENT: Negative for congestion, ear pain and sore throat. Eyes: Positive for blurred vision. Negative for double vision and pain. Respiratory: Positive for shortness of breath. Negative for cough. Cardiovascular: Negative for chest pain and leg swelling. Gastrointestinal: Positive for abdominal pain, constipation, diarrhea, heartburn and nausea. Negative for blood in stool and vomiting. Genitourinary: Negative. Musculoskeletal: Positive for back pain, joint pain, myalgias and neck pain. Skin: Negative. Neurological: Positive for dizziness, weakness and headaches. Negative for tingling and seizures. Endo/Heme/Allergies: Negative for environmental allergies. Psychiatric/Behavioral: Positive for depression. Negative for suicidal ideas. The patient is nervous/anxious. The patient does not have insomnia. All other systems reviewed and are negative. Physical Exam   Constitutional: He is oriented to person, place, and time and well-developed, well-nourished, and in no distress. No distress. HENT:   Head: Normocephalic and atraumatic. Eyes: EOM are normal.   Neck: Normal range of motion. Pulmonary/Chest: Effort normal.   Musculoskeletal: Normal range of motion. Neurological: He is alert and oriented to person, place, and time. He has normal reflexes. Gait abnormal.   Skin: Skin is dry. No rash noted. No erythema.    Psychiatric: Mood, memory, affect and judgment normal.   Nursing note and vitals reviewed. ASSESSMENT:    1. Chronic bilateral low back pain without sciatica    2. Cervicalgia    3. Pain in joint, multiple sites    4. Chronic pain syndrome        PLAN / Pt Instructions:  1. Continue current plan with no evidence of addiction or diversion. Stable on current medication without adverse events. 2. Refill Fentanyl 75 mcg every 72 hours  3. Refill MSIR 30 mg up to 4 times per day for severe pain   4. Add gabapentin 300 mg. Start 1 tablet once nightly ×1 week, then every 12 hours ×1 week, then every 8 hours thereafter. 2 refills. 5. Please follow-up with PCP and/or neurologist regarding migraine headaches  6. Discussed risks of addiction, dependency, and opioid induced hyperalgesia. 7. Return to clinic in 3 months     Medications Ordered Today   Medications    fentaNYL (DURAGESIC) 75 mcg/hr     Si Patch by TransDERmal route every seventy-two (72) hours for 30 days. Max Daily Amount: 1 Patch. For chronic pain. Mylan brand preferred  Indications: Chronic Pain with Opioid Tolerance, SEVERE PAIN WITH OPIOID TOLERANCE     Dispense:  10 Patch     Refill:  0    fentaNYL (DURAGESIC) 75 mcg/hr     Si Patch by TransDERmal route every seventy-two (72) hours for 30 days. Max Daily Amount: 1 Patch. For chronic pain. Mylan brand preferred  Indications: Chronic Pain with Opioid Tolerance, SEVERE PAIN WITH OPIOID TOLERANCE     Dispense:  10 Patch     Refill:  0    fentaNYL (DURAGESIC) 75 mcg/hr     Si Patch by TransDERmal route every seventy-two (72) hours for 30 days. Max Daily Amount: 1 Patch. Mylan brand preferred  Indications: Chronic Pain with Opioid Tolerance, SEVERE PAIN WITH OPIOID TOLERANCE     Dispense:  10 Patch     Refill:  0    morphine IR (MS IR) 30 mg tablet     Sig: Take 1 Tab by mouth four (4) times daily for 30 days.  Indications: Pain, Severe Pain     Dispense:  120 Tab     Refill:  0    morphine IR (MS IR) 30 mg tablet     Sig: Take 1 Tab by mouth four (4) times daily as needed for Pain for up to 30 days. Max Daily Amount: 120 mg. Indications: Pain, Severe Pain     Dispense:  120 Tab     Refill:  0    morphine IR (MS IR) 30 mg tablet     Sig: Take 1 Tab by mouth four (4) times daily as needed for Pain for up to 30 days. Max Daily Amount: 120 mg. Indications: Pain, Severe Pain     Dispense:  120 Tab     Refill:  0    gabapentin (NEURONTIN) 300 mg capsule     Sig: Take 1 Cap by mouth three (3) times daily for 30 days. Dispense:  90 Cap     Refill:  2       Pain medications prescribed with the objective of pain relief and improved physical and psychosocial function in this patient. Spent 25 minutes with patient today reviewing the treatment plan, goals of treatment plan, and limitations of the treatment plan, to include the potential for side effects from medications and procedures. Phil Cronin 9/18/2017      Note: Please excuse any typographical errors. Voice recognition software was used for this note and may cause mistakes.

## 2017-09-18 NOTE — PROGRESS NOTES
Nursing Notes    Patient presents to the office today in follow-up. Patient rates his pain at 5/10 on the numerical pain scale. Reviewed medications with counts as follows:    Rx Date filled Qty Dispensed Pill Count Last Dose Short   Fentanyl 75 mcg/hr 09/06/17 10 6+1 on  Current patch on left arm no   Morphine sulfate IR 30 mg 09/06/17 120 71 This a.m no                            POC UDS was performed in office today. Any new labs or imaging since last appointment? NO    Have you been to an emergency room (ER) or urgent care clinic since your last visit? NO            Have you been hospitalized since your last visit? NO     If yes, where, when, and reason for visit? Have you seen or consulted any other health care providers outside of the [address] [place] [address]  since your last visit? NO     If yes, where, when, and reason for visit? HM deferred to pcp.

## 2017-09-18 NOTE — PATIENT INSTRUCTIONS
1. Continue current plan with no evidence of addiction or diversion. Stable on current medication without adverse events. 2. Refill Fentanyl 75 mcg every 72 hours  3. Refill MSIR 30 mg up to 4 times per day for severe pain   4. Add gabapentin 300 mg. Start 1 tablet once nightly ×1 week, then every 12 hours ×1 week, then every 8 hours thereafter. 2 refills. 5. Please follow-up with PCP and/or neurologist regarding migraine headaches  6. Discussed risks of addiction, dependency, and opioid induced hyperalgesia.    7. Return to clinic in 3 months

## 2017-09-19 LAB
ALCOHOL UR POC: NORMAL
AMPHETAMINES UR POC: NORMAL
BARBITURATES UR POC: NORMAL
BENZODIAZEPINES UR POC: NORMAL
BUPRENORPHINE UR POC: NORMAL
CANNABINOIDS UR POC: NORMAL
CARISOPRODOL UR POC: NORMAL
COCAINE UR POC: NORMAL
FENTANYL UR POC: NORMAL
MDMA/ECSTASY UR POC: NORMAL
METHADONE UR POC: NORMAL
METHAMPHETAMINE UR POC: NORMAL
METHYLPHENIDATE UR POC: NORMAL
OPIATES UR POC: NORMAL
OXYCODONE UR POC: NORMAL
PHENCYCLIDINE UR POC: NORMAL
PROPOXYPHENE UR POC: NORMAL
TRAMADOL UR POC: NORMAL
TRICYCLICS UR POC: NORMAL

## 2017-12-11 ENCOUNTER — OFFICE VISIT (OUTPATIENT)
Dept: PAIN MANAGEMENT | Age: 62
End: 2017-12-11

## 2017-12-11 VITALS
WEIGHT: 156 LBS | HEART RATE: 62 BPM | RESPIRATION RATE: 16 BRPM | TEMPERATURE: 96.8 F | BODY MASS INDEX: 25.99 KG/M2 | HEIGHT: 65 IN | SYSTOLIC BLOOD PRESSURE: 149 MMHG | DIASTOLIC BLOOD PRESSURE: 76 MMHG

## 2017-12-11 DIAGNOSIS — M25.50 PAIN IN JOINT, MULTIPLE SITES: ICD-10-CM

## 2017-12-11 DIAGNOSIS — M54.2 CERVICALGIA: ICD-10-CM

## 2017-12-11 DIAGNOSIS — G89.4 CHRONIC PAIN SYNDROME: ICD-10-CM

## 2017-12-11 DIAGNOSIS — M79.604 PAIN IN BOTH LOWER EXTREMITIES: Primary | ICD-10-CM

## 2017-12-11 DIAGNOSIS — G89.29 CHRONIC BILATERAL LOW BACK PAIN WITHOUT SCIATICA: ICD-10-CM

## 2017-12-11 DIAGNOSIS — M79.605 PAIN IN BOTH LOWER EXTREMITIES: Primary | ICD-10-CM

## 2017-12-11 DIAGNOSIS — M54.50 CHRONIC BILATERAL LOW BACK PAIN WITHOUT SCIATICA: ICD-10-CM

## 2017-12-11 DIAGNOSIS — M54.10 RADICULITIS: ICD-10-CM

## 2017-12-11 RX ORDER — MORPHINE SULFATE 30 MG/1
30 TABLET ORAL
Qty: 120 TAB | Refills: 0 | Status: SHIPPED | OUTPATIENT
Start: 2018-01-02 | End: 2018-03-19 | Stop reason: SDUPTHER

## 2017-12-11 RX ORDER — MORPHINE SULFATE 30 MG/1
30 TABLET ORAL 4 TIMES DAILY
Qty: 120 TAB | Refills: 0 | Status: SHIPPED | OUTPATIENT
Start: 2018-03-02 | End: 2018-03-19 | Stop reason: SDUPTHER

## 2017-12-11 RX ORDER — FENTANYL 75 UG/H
1 PATCH TRANSDERMAL
Qty: 10 PATCH | Refills: 0 | Status: SHIPPED | OUTPATIENT
Start: 2018-02-01 | End: 2018-03-19 | Stop reason: SDUPTHER

## 2017-12-11 RX ORDER — PREGABALIN 50 MG/1
50 CAPSULE ORAL EVERY 12 HOURS
Qty: 60 CAP | Refills: 2 | Status: SHIPPED | OUTPATIENT
Start: 2017-12-11

## 2017-12-11 RX ORDER — FENTANYL 75 UG/H
1 PATCH TRANSDERMAL
Qty: 10 PATCH | Refills: 0 | Status: SHIPPED | OUTPATIENT
Start: 2018-03-02 | End: 2018-03-19 | Stop reason: SDUPTHER

## 2017-12-11 RX ORDER — FENTANYL 75 UG/H
1 PATCH TRANSDERMAL
Qty: 10 PATCH | Refills: 0 | Status: SHIPPED | OUTPATIENT
Start: 2018-01-02 | End: 2018-03-19 | Stop reason: SDUPTHER

## 2017-12-11 RX ORDER — MORPHINE SULFATE 30 MG/1
30 TABLET ORAL
Qty: 120 TAB | Refills: 0 | Status: SHIPPED | OUTPATIENT
Start: 2018-02-01 | End: 2018-03-19 | Stop reason: SDUPTHER

## 2017-12-11 NOTE — PATIENT INSTRUCTIONS
1. Continue current plan with no evidence of addiction or diversion. Stable on current medication without adverse events. 2. Refill Fentanyl 75 mcg every 72 hours  3. Refill MSIR 30 mg up to 4 times per day for severe pain   4. Discontinue gabapentin 300 mg.  5. Add Lyrica 50 mg every 12 hours. 6. Referral to vein center. 7. Please follow-up with PCP and/or neurologist regarding migraine headaches  8. Discussed risks of addiction, dependency, and opioid induced hyperalgesia.    9. Return to clinic in 3 months

## 2017-12-11 NOTE — PROGRESS NOTES
HISTORY OF PRESENT ILLNESS  Rick Glass is a 65 y.o. male    HPI: Mr. Maria Antonia Hood  returns today for f/u of chronic severe pain which is widespread and polyarticular related to generalized osteoarthritis. In addition, he suffers from chronic low back pain. He is here today with his wife. He continues unchanged since last visit. He was recently referred to vascular surgeon for bilateral lower extremity pain and discoloration. During this visit the provider felt that his lower extremity pain was coming from his back. During our last visit we initiated gabapentin at a low tapering dose. He says that he cannot tolerate gabapentin which caused significant stomach pain and cramps. We will go ahead and discontinue gabapentin. He is interested in trying Lyrica which will be initiated at a low tapering dose. He has followed up with his PCP since our last visit. He reports that his PCP still feels that his lower extremity pain is vascular in nature. He is interested in second opinion. We will place another referral today. He is otherwise doing well with his treatment plan which has been offering significant pain control. I will have him follow-up in 3 months or sooner if needed. Medication management consists of Fentanyl 75 mcg patch q 72 hrs and Morphine 30 mg QID PRN. Medications are helping with pain control and quality of life. His pain is 5-6/10 with medication and 10/10 without. Pt describes pain as constant, tender, and aching. Aggravating factors include most activity. Relieved with rest, medication, and avoiding painful activities. Current treatment is helping to improve general activity, mood, walking, sleep, enjoyment of life    He  is otherwise doing well with no other complaints today. He denies any current adverse events including nausea, vomiting, dizziness, constipation, hallucinations, or seizures.      PRIOR IMAGIN. Lumbar MRI 2011: L5-S1 DDD with only narrowed central canal stenosis. Mild to moderate left and mild right foraminal narrowing       Allergies   Allergen Reactions    Vicodin [Hydrocodone-Acetaminophen] Nausea and Vomiting       Past Surgical History:   Procedure Laterality Date    HX ADENOIDECTOMY      HX CHOLECYSTECTOMY      HX TONSILLECTOMY      ORAL SURGERY PROCEDURE           Review of Systems   Constitutional: Positive for malaise/fatigue. Negative for chills, fever and weight loss. HENT: Negative for congestion, ear pain and sore throat. Eyes: Positive for blurred vision. Negative for double vision and pain. Respiratory: Positive for shortness of breath. Negative for cough. Cardiovascular: Negative for chest pain and leg swelling. Gastrointestinal: Positive for abdominal pain, constipation, diarrhea, heartburn and nausea. Negative for blood in stool and vomiting. Genitourinary: Negative. Musculoskeletal: Positive for back pain, joint pain, myalgias and neck pain. Skin: Negative. Neurological: Positive for dizziness, weakness and headaches. Negative for tingling and seizures. Endo/Heme/Allergies: Negative for environmental allergies. Psychiatric/Behavioral: Positive for depression. Negative for suicidal ideas. The patient is nervous/anxious. The patient does not have insomnia. All other systems reviewed and are negative. Physical Exam   Constitutional: He is oriented to person, place, and time and well-developed, well-nourished, and in no distress. No distress. HENT:   Head: Normocephalic and atraumatic. Eyes: EOM are normal.   Neck: Normal range of motion. Pulmonary/Chest: Effort normal.   Musculoskeletal: Normal range of motion. Neurological: He is alert and oriented to person, place, and time. He has normal reflexes. Gait abnormal.   Skin: Skin is dry. No rash noted. No erythema. Psychiatric: Mood, memory, affect and judgment normal.   Nursing note and vitals reviewed. ASSESSMENT:    1.  Pain in both lower extremities    2. Chronic bilateral low back pain without sciatica    3. Cervicalgia    4. Pain in joint, multiple sites    5. Chronic pain syndrome    6. Radiculitis        PLAN / Pt Instructions:  1. Continue current plan with no evidence of addiction or diversion. Stable on current medication without adverse events. 2. Refill Fentanyl 75 mcg every 72 hours  3. Refill MSIR 30 mg up to 4 times per day for severe pain   4. Discontinue gabapentin 300 mg.  5. Add Lyrica 50 mg every 12 hours. 6. Referral to vein center. 7. Please follow-up with PCP and/or neurologist regarding migraine headaches  8. Discussed risks of addiction, dependency, and opioid induced hyperalgesia. 9. Return to clinic in 3 months     Medications Ordered Today   Medications    fentaNYL (DURAGESIC) 75 mcg/hr     Si Patch by TransDERmal route every seventy-two (72) hours for 30 days. Max Daily Amount: 1 Patch. Mylan brand preferred  Indications: Chronic Pain with Opioid Tolerance, SEVERE PAIN WITH OPIOID TOLERANCE     Dispense:  10 Patch     Refill:  0    fentaNYL (DURAGESIC) 75 mcg/hr     Si Patch by TransDERmal route every seventy-two (72) hours for 30 days. Max Daily Amount: 1 Patch. For chronic pain. Mylan brand preferred  Indications: Chronic Pain with Opioid Tolerance, SEVERE PAIN WITH OPIOID TOLERANCE     Dispense:  10 Patch     Refill:  0    fentaNYL (DURAGESIC) 75 mcg/hr     Si Patch by TransDERmal route every seventy-two (72) hours for 30 days. Max Daily Amount: 1 Patch. For chronic pain. Mylan brand preferred  Indications: Chronic Pain with Opioid Tolerance, SEVERE PAIN WITH OPIOID TOLERANCE     Dispense:  10 Patch     Refill:  0    morphine IR (MS IR) 30 mg tablet     Sig: Take 1 Tab by mouth four (4) times daily as needed for Pain for up to 30 days. Max Daily Amount: 120 mg.  Indications: Pain, Severe Pain     Dispense:  120 Tab     Refill:  0    morphine IR (MS IR) 30 mg tablet     Sig: Take 1 Tab by mouth four (4) times daily as needed for Pain for up to 30 days. Max Daily Amount: 120 mg. Indications: Pain, Severe Pain     Dispense:  120 Tab     Refill:  0    morphine IR (MS IR) 30 mg tablet     Sig: Take 1 Tab by mouth four (4) times daily for 30 days. Indications: Pain, Severe Pain     Dispense:  120 Tab     Refill:  0    pregabalin (LYRICA) 50 mg capsule     Sig: Take 1 Cap by mouth every twelve (12) hours. Max Daily Amount: 100 mg. Dispense:  60 Cap     Refill:  2       Pain medications prescribed with the objective of pain relief and improved physical and psychosocial function in this patient. Spent 25 minutes with patient today reviewing the treatment plan, goals of treatment plan, and limitations of the treatment plan, to include the potential for side effects from medications and procedures. Luis Angel [place] 12/11/2017      Note: Please excuse any typographical errors. Voice recognition software was used for this note and may cause mistakes.

## 2017-12-11 NOTE — PROGRESS NOTES
Nursing Notes    Patient presents to the office today in follow-up. Patient rates his pain at 4/10 on the numerical pain scale. Reviewed medications with counts as follows:    Rx Date filled Qty Dispensed Pill Count Last Dose Short   MORPHINE SULFATE IR 30 MG TAB 12/3/17 120 87 TODAY NO   FENTANYL 75 MCG/HR PATCH 12/3/17 10 6 + 1 PATCH WEARING NOW TODAY  NO                                Comments:     POC UDS was not performed in office today    Any new labs or imaging since last appointment? NO    Have you been to an emergency room (ER) or urgent care clinic since your last visit? NO            Have you been hospitalized since your last visit? NO     If yes, where, when, and reason for visit? Have you seen or consulted any other health care providers outside of the [address] [place] [address]  since your last visit? NO     If yes, where, when, and reason for visit? HM deferred to pcp.

## 2017-12-11 NOTE — MR AVS SNAPSHOT
Visit Information Date & Time Provider Department Dept. Phone Encounter #  
 12/11/2017  8:00 AM [facility] [phone] [id number] Follow-up Instructions Return in about 3 months (around 3/11/2018). Upcoming Health Maintenance Date Due Hepatitis C Screening 1955 Pneumococcal 19-64 Medium Risk (1 of 1 - PPSV23) 1/29/1975 DTaP/Tdap/Td series (1 - Tdap) 1/29/1977 FOBT Q 1 YEAR AGE 50-75 1/29/2006 ZOSTER VACCINE AGE 60> 11/29/2015 Influenza Age 5 to Adult 8/1/2017 Allergies as of 12/11/2017  Review Complete On: 12/11/2017 By: CATALINA Guy Severity Noted Reaction Type Reactions Vicodin [Hydrocodone-acetaminophen]    Nausea and Vomiting Current Immunizations  Never Reviewed No immunizations on file. Not reviewed this visit You Were Diagnosed With   
  
 Codes Comments Pain in both lower extremities    -  Primary ICD-10-CM: M79.604, M79.605 ICD-9-CM: 729.5 Chronic bilateral low back pain without sciatica     ICD-10-CM: M54.5, G89.29 ICD-9-CM: 724.2, 338.29 Cervicalgia     ICD-10-CM: M54.2 ICD-9-CM: 723.1 Pain in joint, multiple sites     ICD-10-CM: M25.50 ICD-9-CM: 719.49 Chronic pain syndrome     ICD-10-CM: G89.4 ICD-9-CM: 338. 4 Vitals BP Pulse Temp Resp Height(growth percentile) Weight(growth percentile) 149/76 (BP 1 Location: Right arm, BP Patient Position: Sitting) 62 96.8 °F (36 °C) (Oral) 16 5' 5\" (1.651 m) 156 lb (70.8 kg) BMI Smoking Status 25.96 kg/m2 Current Every Day Smoker Vitals History BMI and BSA Data Body Mass Index Body Surface Area  
 25.96 kg/m 2 1.8 m 2 Preferred Pharmacy Pharmacy Name Phone [id number], [place] - [address] [place] [address] [phone] Your Updated Medication List  
  
   
 This list is accurate as of: 12/11/17  8:37 AM.  Always use your most recent med list.  
  
  
  
  
 ANDROGEL 1 % (25 mg/2.5gram) Glpk Generic drug:  testosterone 25 mg by TransDERmal route daily. cloNIDine HCl 0.1 mg tablet Commonly known as:  CATAPRES Take 1 Tab by mouth three (3) times daily. For anxiety, agitation, muscle aches, sweating, runny nose, and/or cramping. COMBIVENT  mcg/actuation inhaler Generic drug:  ipratropium-albuterol Take  by inhalation every six (6) hours as needed for Wheezing. CYMBALTA 60 mg capsule Generic drug:  DULoxetine Take 60 mg by mouth daily. ergocalciferol 50,000 unit capsule Commonly known as:  ERGOCALCIFEROL Take 1 Cap by mouth every seven (7) days. * fentaNYL 75 mcg/hr Commonly known as:  DURAGESIC  
1 Patch by TransDERmal route every seventy-two (72) hours for 30 days. Max Daily Amount: 1 Patch. Mylan brand preferred  Indications: Chronic Pain with Opioid Tolerance, SEVERE PAIN WITH OPIOID TOLERANCE Start taking on:  1/2/2018 * fentaNYL 75 mcg/hr Commonly known as:  DURAGESIC  
1 Patch by TransDERmal route every seventy-two (72) hours for 30 days. Max Daily Amount: 1 Patch. For chronic pain. Mylan brand preferred  Indications: Chronic Pain with Opioid Tolerance, SEVERE PAIN WITH OPIOID TOLERANCE Start taking on:  2/1/2018 * fentaNYL 75 mcg/hr Commonly known as:  DURAGESIC  
1 Patch by TransDERmal route every seventy-two (72) hours for 30 days. Max Daily Amount: 1 Patch. For chronic pain. Mylan brand preferred  Indications: Chronic Pain with Opioid Tolerance, SEVERE PAIN WITH OPIOID TOLERANCE Start taking on:  3/2/2018  
  
 frovatriptan 2.5 mg tablet Commonly known as:  Evins Joanna Take 1 Tab by mouth once as needed for Migraine for up to 1 dose. LORazepam 1 mg tablet Commonly known as:  ATIVAN Take 0.5-1 Tabs by mouth three (3) times daily as needed for Anxiety.  Max Daily Amount: 3 mg. Indications: ANXIETY * morphine IR 30 mg tablet Commonly known as:  MS IR Take 1 Tab by mouth four (4) times daily as needed for Pain for up to 30 days. Max Daily Amount: 120 mg. Indications: Pain, Severe Pain Start taking on:  2018 * morphine IR 30 mg tablet Commonly known as:  MS IR Take 1 Tab by mouth four (4) times daily as needed for Pain for up to 30 days. Max Daily Amount: 120 mg. Indications: Pain, Severe Pain Start taking on:  2018 * morphine IR 30 mg tablet Commonly known as:  MS IR Take 1 Tab by mouth four (4) times daily for 30 days. Indications: Pain, Severe Pain Start taking on:  3/2/2018  
  
 naloxone 4 mg/actuation nasal spray Commonly known as:  NARCAN  
4 mg by Nasal route as needed for up to 2 doses. Indications: OPIOID TOXICITY  
  
 pregabalin 50 mg capsule Commonly known as:  Erwin Flash Take 1 Cap by mouth every twelve (12) hours. Max Daily Amount: 100 mg.  
  
 promethazine 25 mg tablet Commonly known as:  PHENERGAN Take 1 Tab by mouth two (2) times a day. Prn nausea/vomiting PROTONIX 40 mg tablet Generic drug:  pantoprazole Take 40 mg by mouth daily. traZODone 150 mg tablet Commonly known as:  Brisa Dessert Take 150 mg by mouth nightly. * Notice: This list has 6 medication(s) that are the same as other medications prescribed for you. Read the directions carefully, and ask your doctor or other care provider to review them with you. Prescriptions Printed Refills  
 fentaNYL (DURAGESIC) 75 mcg/hr 0 Starting on: 2018 Si Patch by TransDERmal route every seventy-two (72) hours for 30 days. Max Daily Amount: 1 Patch. Mylan brand preferred  Indications: Chronic Pain with Opioid Tolerance, SEVERE PAIN WITH OPIOID TOLERANCE Class: Print Route: TransDERmal  
 fentaNYL (DURAGESIC) 75 mcg/hr 0 Starting on: 2018 Si Patch by TransDERmal route every seventy-two (72) hours for 30 days. Max Daily Amount: 1 Patch. For chronic pain. Mylan brand preferred  Indications: Chronic Pain with Opioid Tolerance, SEVERE PAIN WITH OPIOID TOLERANCE Class: Print Route: TransDERmal  
 fentaNYL (DURAGESIC) 75 mcg/hr 0 Starting on: 3/2/2018 Si Patch by TransDERmal route every seventy-two (72) hours for 30 days. Max Daily Amount: 1 Patch. For chronic pain. Mylan brand preferred  Indications: Chronic Pain with Opioid Tolerance, SEVERE PAIN WITH OPIOID TOLERANCE Class: Print Route: TransDERmal  
 morphine IR (MS IR) 30 mg tablet 0 Starting on: 2018 Sig: Take 1 Tab by mouth four (4) times daily as needed for Pain for up to 30 days. Max Daily Amount: 120 mg. Indications: Pain, Severe Pain Class: Print Route: Oral  
 morphine IR (MS IR) 30 mg tablet 0 Starting on: 2018 Sig: Take 1 Tab by mouth four (4) times daily as needed for Pain for up to 30 days. Max Daily Amount: 120 mg. Indications: Pain, Severe Pain Class: Print Route: Oral  
 morphine IR (MS IR) 30 mg tablet 0 Starting on: 3/2/2018 Sig: Take 1 Tab by mouth four (4) times daily for 30 days. Indications: Pain, Severe Pain Class: Print Route: Oral  
 pregabalin (LYRICA) 50 mg capsule 2 Sig: Take 1 Cap by mouth every twelve (12) hours. Max Daily Amount: 100 mg. Class: Print Route: Oral  
  
We Performed the Following REFERRAL TO VASCULAR CENTER [[id number] Custom] Comments:  
 Please evaluate patient for bilateral leg pain with discoloration. Follow-up Instructions Return in about 3 months (around 3/11/2018). Referral Information Referral ID Referred By Referred To  
  
 [id number] JOSE ALFREDO Santamaria Not Available Visits Status Start Date End Date 1 New Request 17  If your referral has a status of pending review or denied, additional information will be sent to support the outcome of this decision. Patient Instructions 1. Continue current plan with no evidence of addiction or diversion. Stable on current medication without adverse events. 2. Refill Fentanyl 75 mcg every 72 hours 3. Refill MSIR 30 mg up to 4 times per day for severe pain 4. Discontinue gabapentin 300 mg. 
5. Add Lyrica 50 mg every 12 hours. 6. Referral to vein center. 7. Please follow-up with PCP and/or neurologist regarding migraine headaches 8. Discussed risks of addiction, dependency, and opioid induced hyperalgesia. 9. Return to clinic in 3 months Introducing [facility]! [facility] introduces [employer] patient portal. Now you can access parts of your medical record, email your doctor's office, and request medication refills online. 1. In your internet browser, go to [email or web address] 2. Click on the First Time User? Click Here link in the Sign In box. You will see the New Member Sign Up page. 3. Enter your [employer]ia Access Code exactly as it appears below. You will not need to use this code after youve completed the sign-up process. If you do not sign up before the expiration date, you must request a new code. · [employer]ia Access Code: [email or web address] Expires: 12/17/2017  7:25 AM 
 
4. Enter the last four digits of your Social Security Number (xxxx) and Date of Birth (mm/dd/yyyy) as indicated and click Submit. You will be taken to the next sign-up page. 5. Create a Iperia ID. This will be your I[employer]ia login ID and cannot be changed, so think of one that is secure and easy to remember. 6. Create a [employer]ia password. You can change your password at any time. 7. Enter your Password Reset Question and Answer. This can be used at a later time if you forget your password. 8. Enter your e-mail address. You will receive e-mail notification when new information is available in [address] 9. Click Sign Up. You can now view and download portions of your medical record. 10. Click the Download Summary menu link to download a portable copy of your medical information. If you have questions, please visit the Frequently Asked Questions section of the VTM website. Remember, VTM is NOT to be used for urgent needs. For medical emergencies, dial 911. Now available from your iPhone and Android! Please provide this summary of care documentation to your next provider. Your primary care clinician is listed as Jackie Lopez. If you have any questions after today's visit, please call [phone].

## 2017-12-12 ENCOUNTER — DOCUMENTATION ONLY (OUTPATIENT)
Dept: PAIN MANAGEMENT | Age: 62
End: 2017-12-12

## 2017-12-12 NOTE — PROGRESS NOTES
The pt's referral was faxed over to the [facility]. A fax confirmation was received and they should contact the pt.

## 2018-03-13 ENCOUNTER — TELEPHONE (OUTPATIENT)
Dept: PAIN MANAGEMENT | Age: 63
End: 2018-03-13

## 2018-03-14 ENCOUNTER — TELEPHONE (OUTPATIENT)
Dept: PAIN MANAGEMENT | Age: 63
End: 2018-03-14

## 2018-03-14 NOTE — TELEPHONE ENCOUNTER
[facility] approved [place] for the Morphine Sulf. Approval is good from 3/13/18-3/13/19. Faxed approval letter to [address] [place] [address]. I attempted to contact the patient to inform him of the approval, but no one answered at his home number, and the cell phone number that he provided is actually his wife's number who answered the phone, but because she wasn't on the patient's [facility] list, I told her I could not discuss the situation with her.

## 2018-03-19 ENCOUNTER — OFFICE VISIT (OUTPATIENT)
Dept: PAIN MANAGEMENT | Age: 63
End: 2018-03-19

## 2018-03-19 VITALS
TEMPERATURE: 97.3 F | WEIGHT: 150 LBS | HEART RATE: 55 BPM | BODY MASS INDEX: 24.99 KG/M2 | DIASTOLIC BLOOD PRESSURE: 67 MMHG | RESPIRATION RATE: 14 BRPM | SYSTOLIC BLOOD PRESSURE: 125 MMHG | HEIGHT: 65 IN

## 2018-03-19 DIAGNOSIS — M54.6 PAIN IN THORACIC SPINE: ICD-10-CM

## 2018-03-19 DIAGNOSIS — M54.50 CHRONIC BILATERAL LOW BACK PAIN WITHOUT SCIATICA: ICD-10-CM

## 2018-03-19 DIAGNOSIS — Z79.899 ENCOUNTER FOR LONG-TERM (CURRENT) USE OF MEDICATIONS: Primary | ICD-10-CM

## 2018-03-19 DIAGNOSIS — M54.2 CERVICALGIA: ICD-10-CM

## 2018-03-19 DIAGNOSIS — G89.4 CHRONIC PAIN SYNDROME: ICD-10-CM

## 2018-03-19 DIAGNOSIS — G89.29 CHRONIC BILATERAL LOW BACK PAIN WITHOUT SCIATICA: ICD-10-CM

## 2018-03-19 DIAGNOSIS — M25.50 PAIN IN JOINT, MULTIPLE SITES: ICD-10-CM

## 2018-03-19 LAB
ALCOHOL UR POC: NORMAL
AMPHETAMINES UR POC: NEGATIVE
BARBITURATES UR POC: NORMAL
BENZODIAZEPINES UR POC: NEGATIVE
BUPRENORPHINE UR POC: NEGATIVE
CANNABINOIDS UR POC: NEGATIVE
CARISOPRODOL UR POC: NORMAL
COCAINE UR POC: NEGATIVE
FENTANYL UR POC: NORMAL
MDMA/ECSTASY UR POC: NORMAL
METHADONE UR POC: NEGATIVE
METHAMPHETAMINE UR POC: NORMAL
METHYLPHENIDATE UR POC: NORMAL
OPIATES UR POC: NORMAL
OXYCODONE UR POC: NORMAL
PHENCYCLIDINE UR POC: NORMAL
PROPOXYPHENE UR POC: NORMAL
TRAMADOL UR POC: NORMAL
TRICYCLICS UR POC: NORMAL

## 2018-03-19 RX ORDER — FENTANYL 75 UG/H
1 PATCH TRANSDERMAL
Qty: 10 PATCH | Refills: 0 | Status: SHIPPED | OUTPATIENT
Start: 2018-04-01 | End: 2018-05-01

## 2018-03-19 RX ORDER — MORPHINE SULFATE 30 MG/1
30 TABLET ORAL
Qty: 120 TAB | Refills: 0 | Status: SHIPPED | OUTPATIENT
Start: 2018-04-30 | End: 2018-06-18 | Stop reason: SDUPTHER

## 2018-03-19 RX ORDER — MORPHINE SULFATE 30 MG/1
30 TABLET ORAL 4 TIMES DAILY
Qty: 120 TAB | Refills: 0 | Status: SHIPPED | OUTPATIENT
Start: 2018-04-01 | End: 2018-06-18 | Stop reason: SDUPTHER

## 2018-03-19 RX ORDER — MORPHINE SULFATE 30 MG/1
30 TABLET ORAL
Qty: 120 TAB | Refills: 0 | Status: SHIPPED | OUTPATIENT
Start: 2018-05-29 | End: 2018-06-18 | Stop reason: SDUPTHER

## 2018-03-19 RX ORDER — FENTANYL 75 UG/H
1 PATCH TRANSDERMAL
Qty: 10 PATCH | Refills: 0 | Status: SHIPPED | OUTPATIENT
Start: 2018-04-30 | End: 2018-05-30

## 2018-03-19 RX ORDER — FENTANYL 75 UG/H
1 PATCH TRANSDERMAL
Qty: 10 PATCH | Refills: 0 | Status: SHIPPED | OUTPATIENT
Start: 2018-05-29 | End: 2018-06-28

## 2018-03-19 NOTE — PROGRESS NOTES
Nursing Notes    Patient presents to the office today in follow-up. Patient rates his pain at 5/10 on the numerical pain scale. Reviewed medications with counts as follows:    Rx Date filled Qty Dispensed Pill Count Last Dose Short   Fentanyl 75 mcg/hr  03/02/18 10 4+1 on  Current patch on right arm no   Morphine sulfate IR 30 mg 03/02/18 120 51 today no                              POC UDS was performed in office today    Any new labs or imaging since last appointment? NO    Have you been to an emergency room (ER) or urgent care clinic since your last visit? YES. Pt went to the ER for the flu and a migraine            Have you been hospitalized since your last visit? NO     If yes, where, when, and reason for visit? Have you seen or consulted any other health care providers outside of the [address] [place] [address]  since your last visit? NO     If yes, where, when, and reason for visit? HM deferred to pcp.

## 2018-03-19 NOTE — MR AVS SNAPSHOT
[address] [address] 
[phone] Patient: Ramy Nguyen MRN: [id number] Visit Information Date & Time Provider Department Dept. Phone Encounter #  
 3/19/2018  8:00 AM [facility] [phone] [id number] Follow-up Instructions Return in about 3 months (around 6/19/2018). Upcoming Health Maintenance Date Due Hepatitis C Screening 1955 Pneumococcal 19-64 Medium Risk (1 of 1 - PPSV23) 1/29/1975 DTaP/Tdap/Td series (1 - Tdap) 1/29/1977 FOBT Q 1 YEAR AGE 50-75 1/29/2006 ZOSTER VACCINE AGE 60> 11/29/2015 Influenza Age 5 to Adult 8/1/2017 Allergies as of 3/19/2018  Review Complete On: 3/19/2018 By: CATALINA Phipps Severity Noted Reaction Type Reactions Vicodin [Hydrocodone-acetaminophen]    Nausea and Vomiting Current Immunizations  Never Reviewed No immunizations on file. Not reviewed this visit You Were Diagnosed With   
  
 Codes Comments Encounter for long-term (current) use of medications    -  Primary ICD-10-CM: T55.612 ICD-9-CM: V58.69 Chronic pain syndrome     ICD-10-CM: G89.4 ICD-9-CM: 338. 4 Chronic bilateral low back pain without sciatica     ICD-10-CM: M54.5, G89.29 ICD-9-CM: 724.2, 338.29 Cervicalgia     ICD-10-CM: M54.2 ICD-9-CM: 723.1 Pain in thoracic spine     ICD-10-CM: M54.6 ICD-9-CM: 724.1 Pain in joint, multiple sites     ICD-10-CM: M25.50 ICD-9-CM: 719.49 Vitals BP Pulse Temp Resp Height(growth percentile) Weight(growth percentile) 125/67 (BP 1 Location: Left arm, BP Patient Position: Sitting) (!) 55 97.3 °F (36.3 °C) (Oral) 14 5' 5\" (1.651 m) 150 lb (68 kg) BMI Smoking Status 24.96 kg/m2 Current Every Day Smoker Vitals History BMI and BSA Data Body Mass Index Body Surface Area 24.96 kg/m 2 1.77 m 2 Preferred Pharmacy Pharmacy Name Phone [id number] [address], [place] [address] [place] [address] [phone] Your Updated Medication List  
  
   
This list is accurate as of 3/19/18  8:51 AM.  Always use your most recent med list.  
  
  
  
  
 ANDROGEL 1 % (25 mg/2.5gram) Glpk Generic drug:  testosterone 25 mg by TransDERmal route daily. cloNIDine HCl 0.1 mg tablet Commonly known as:  CATAPRES Take 1 Tab by mouth three (3) times daily. For anxiety, agitation, muscle aches, sweating, runny nose, and/or cramping. COMBIVENT  mcg/actuation inhaler Generic drug:  ipratropium-albuterol Take 2 Puffs by inhalation every six (6) hours as needed for Wheezing. CYMBALTA 60 mg capsule Generic drug:  DULoxetine Take 60 mg by mouth daily. ergocalciferol 50,000 unit capsule Commonly known as:  ERGOCALCIFEROL Take 1 Cap by mouth every seven (7) days. * fentaNYL 75 mcg/hr Commonly known as:  DURAGESIC  
1 Patch by TransDERmal route every seventy-two (72) hours for 30 days. Max Daily Amount: 1 Patch. For chronic pain. Mylan brand preferred  Indications: Chronic Pain with Opioid Tolerance, SEVERE PAIN WITH OPIOID TOLERANCE Start taking on:  4/1/2018 * fentaNYL 75 mcg/hr Commonly known as:  DURAGESIC  
1 Patch by TransDERmal route every seventy-two (72) hours for 30 days. Max Daily Amount: 1 Patch. For chronic pain. Mylan brand preferred  Indications: Chronic Pain with Opioid Tolerance, SEVERE PAIN WITH OPIOID TOLERANCE Start taking on:  4/30/2018 * fentaNYL 75 mcg/hr Commonly known as:  DURAGESIC  
1 Patch by TransDERmal route every seventy-two (72) hours for 30 days. Max Daily Amount: 1 Patch. Mylan brand preferred  Indications: Chronic Pain with Opioid Tolerance, SEVERE PAIN WITH OPIOID TOLERANCE Start taking on:  5/29/2018  
  
 frovatriptan 2.5 mg tablet Commonly known as:  Fareed Lama Take 1 Tab by mouth once as needed for Migraine for up to 1 dose. LORazepam 1 mg tablet Commonly known as:  ATIVAN Take 0.5-1 Tabs by mouth three (3) times daily as needed for Anxiety. Max Daily Amount: 3 mg. Indications: ANXIETY * morphine IR 30 mg tablet Commonly known as:  MS IR Take 1 Tab by mouth four (4) times daily for 30 days. Indications: Pain, Severe Pain Start taking on:  2018 * morphine IR 30 mg tablet Commonly known as:  MS IR Take 1 Tab by mouth four (4) times daily as needed for Pain for up to 30 days. Max Daily Amount: 120 mg. Indications: Pain, Severe Pain Start taking on:  2018 * morphine IR 30 mg tablet Commonly known as:  MS IR Take 1 Tab by mouth four (4) times daily as needed for Pain for up to 30 days. Max Daily Amount: 120 mg. Indications: Pain, Severe Pain Start taking on:  2018  
  
 naloxone 4 mg/actuation nasal spray Commonly known as:  NARCAN  
4 mg by Nasal route as needed for up to 2 doses. Indications: OPIOID TOXICITY  
  
 pregabalin 50 mg capsule Commonly known as:  Mukesh Barters Take 1 Cap by mouth every twelve (12) hours. Max Daily Amount: 100 mg.  
  
 promethazine 25 mg tablet Commonly known as:  PHENERGAN Take 1 Tab by mouth two (2) times a day. Prn nausea/vomiting PROTONIX 40 mg tablet Generic drug:  pantoprazole Take 40 mg by mouth daily. traZODone 150 mg tablet Commonly known as:  Princess Mcfadden Take 150 mg by mouth nightly. * Notice: This list has 6 medication(s) that are the same as other medications prescribed for you. Read the directions carefully, and ask your doctor or other care provider to review them with you. Prescriptions Printed Refills  
 fentaNYL (DURAGESIC) 75 mcg/hr 0 Starting on: 2018 Si Patch by TransDERmal route every seventy-two (72) hours for 30 days. Max Daily Amount: 1 Patch. For chronic pain.   Mylan brand preferred Indications: Chronic Pain with Opioid Tolerance, SEVERE PAIN WITH OPIOID TOLERANCE Class: Print Route: TransDERmal  
 fentaNYL (DURAGESIC) 75 mcg/hr 0 Starting on: 2018 Si Patch by TransDERmal route every seventy-two (72) hours for 30 days. Max Daily Amount: 1 Patch. For chronic pain. Mylan brand preferred  Indications: Chronic Pain with Opioid Tolerance, SEVERE PAIN WITH OPIOID TOLERANCE Class: Print Route: TransDERmal  
 fentaNYL (DURAGESIC) 75 mcg/hr 0 Starting on: 2018 Si Patch by TransDERmal route every seventy-two (72) hours for 30 days. Max Daily Amount: 1 Patch. Mylan brand preferred  Indications: Chronic Pain with Opioid Tolerance, SEVERE PAIN WITH OPIOID TOLERANCE Class: Print Route: TransDERmal  
 morphine IR (MS IR) 30 mg tablet 0 Starting on: 2018 Sig: Take 1 Tab by mouth four (4) times daily for 30 days. Indications: Pain, Severe Pain Class: Print Route: Oral  
 morphine IR (MS IR) 30 mg tablet 0 Starting on: 2018 Sig: Take 1 Tab by mouth four (4) times daily as needed for Pain for up to 30 days. Max Daily Amount: 120 mg. Indications: Pain, Severe Pain Class: Print Route: Oral  
 morphine IR (MS IR) 30 mg tablet 0 Starting on: 2018 Sig: Take 1 Tab by mouth four (4) times daily as needed for Pain for up to 30 days. Max Daily Amount: 120 mg. Indications: Pain, Severe Pain Class: Print Route: Oral  
  
We Performed the Following AMB POC DRUG SCREEN () [ [facility]] DRUG SCREEN [[id number] Custom] Follow-up Instructions Return in about 3 months (around 2018). Patient Instructions 1. Continue current plan with no evidence of addiction or diversion. Stable on current medication without adverse events. 2. Refill Fentanyl 75 mcg every 72 hours 3. Refill MSIR 30 mg up to 4 times per day for severe pain 4. Discontinue Lyrica 50 mg. 5. Continue to follow-up with vein center. 6. Discussed risks of addiction, dependency, and opioid induced hyperalgesia. 7. Return to clinic in 3 months Learning About Benefits From Quitting Smoking How does quitting smoking make you healthier? If you're thinking about quitting smoking, you may have a few reasons to be smoke-free. Your health may be one of them. · When you quit smoking, you lower your risks for cancer, lung disease, heart attack, stroke, blood vessel disease, and blindness from macular degeneration. · When you're smoke-free, you get sick less often, and you heal faster. You are less likely to get colds, flu, bronchitis, and pneumonia. · As a nonsmoker, you may find that your mood is better and you are less stressed. When and how will you feel healthier? Quitting has real health benefits that start from day 1 of being smoke-free. And the longer you stay smoke-free, the healthier you get and the better you feel. The first hours · After just 20 minutes, your blood pressure and heart rate go down. That means there's less stress on your heart and blood vessels. · Within 12 hours, the level of carbon monoxide in your blood drops back to normal. That makes room for more oxygen. With more oxygen in your body, you may notice that you have more energy than when you smoked. After 2 weeks · Your lungs start to work better. · Your risk of heart attack starts to drop. After 1 month · When your lungs are clear, you cough less and breathe deeper, so it's easier to be active. · Your sense of taste and smell return. That means you can enjoy food more than you have since you started smoking. Over the years · After 1 year, your risk of heart disease is half what it would be if you kept smoking. · After 5 years, your risk of stroke starts to shrink. Within a few years after that, it's about the same as if you'd never smoked. · After 10 years, your risk of dying from lung cancer is cut by about half. And your risk for many other types of cancer is lower too. How would quitting help others in your life? When you quit smoking, you improve the health of everyone who now breathes in your smoke. · Their heart, lung, and cancer risks drop, much like yours. · They are sick less. For babies and small children, living smoke-free means they're less likely to have ear infections, pneumonia, and bronchitis. · If you're a woman who is or will be pregnant someday, quitting smoking means a healthier . · Children who are close to you are less likely to become adult smokers. Where can you learn more? Go to http://laura-bessy.info/. Enter 052 [id number] 72 11 in the search box to learn more about \"Learning About Benefits From Quitting Smoking. \" Current as of: 2017 Content Version: 11.4 © 9289-4363 [employer]. Care instructions adapted under license by [employer] (which disclaims liability or warranty for this information). If you have questions about a medical condition or this instruction, always ask your healthcare professional. [address] [address] any warranty or liability for your use of this information. Introducing [facility]! Jean Mendez introduces [employer] patient portal. Now you can access parts of your medical record, email your doctor's office, and request medication refills online. 1. In your internet browser, go to [email or web address] 2. Click on the First Time User? Click Here link in the Sign In box. You will see the New Member Sign Up page. 3. Enter your [employer] Access Code exactly as it appears below. You will not need to use this code after youve completed the sign-up process. If you do not sign up before the expiration date, you must request a new code.  
 
· [employer] Access Code: [email or web address] 
 Expires: 4/22/2018 11:55 PM 
 
4. Enter the last four digits of your Social Security Number (xxxx) and Date of Birth (mm/dd/yyyy) as indicated and click Submit. You will be taken to the next sign-up page. 5. Create a Microtask ID. This will be your Microtask login ID and cannot be changed, so think of one that is secure and easy to remember. 6. Create a Microtask password. You can change your password at any time. 7. Enter your Password Reset Question and Answer. This can be used at a later time if you forget your password. 8. Enter your e-mail address. You will receive e-mail notification when new information is available in [address] 9. Click Sign Up. You can now view and download portions of your medical record. 10. Click the Download Summary menu link to download a portable copy of your medical information. If you have questions, please visit the Frequently Asked Questions section of the Microtask website. Remember, Microtask is NOT to be used for urgent needs. For medical emergencies, dial 911. Now available from your iPhone and Android! Please provide this summary of care documentation to your next provider. Your primary care clinician is listed as Jackie Lopez. If you have any questions after today's visit, please call [phone].

## 2018-03-19 NOTE — PROGRESS NOTES
HISTORY OF PRESENT ILLNESS  Tala Coelho is a 59 y.o. male    HPI: Mr. Jamir Sofia  returns today for f/u of chronic severe pain which is widespread and polyarticular related to generalized osteoarthritis. In addition, he suffers from chronic low back pain. He is here today with his wife. He continues unchanged since last visit. He was previously referred to vascular surgeon for bilateral lower extremity pain and discoloration. He reports that he had to cancel and reschedule his appointment but will be following up with them soon. We added Lyrica last visit as he was not tolerating gabapentin which was causing significant stomach pain and cramps. He reports significant headaches with Lyrica and with this will be discontinued. He also reports that both his morphine and fentanyl was paid out of pocket as he was waiting for prior authorization from his insurance. He also notes that his dates that were authorized were off and he is afraid that he will not get compensated for the amount of money paid out of pocket for his medications. He has not received any confirmation that fentanyl was approved. He is otherwise doing well with no other complaints today. I will have him follow-up in 3 months for further evaluation and recommendation. Medication management consists of Fentanyl 75 mcg patch q 72 hrs and Morphine 30 mg QID PRN. Medications are helping with pain control and quality of life. His pain is 5-6/10 with medication and 10/10 without. Pt describes pain as constant, tender, and aching. Aggravating factors include most activity. Relieved with rest, medication, and avoiding painful activities. Current treatment is helping to improve general activity, mood, walking, sleep, enjoyment of life    He  is otherwise doing well with no other complaints today. He denies any current adverse events including nausea, vomiting, dizziness, constipation, hallucinations, or seizures. POC UDS today. Confirmation pending. PRIOR IMAGIN. Lumbar MRI 2011: L5-S1 DDD with only narrowed central canal stenosis. Mild to moderate left and mild right foraminal narrowing       Allergies   Allergen Reactions    Vicodin [Hydrocodone-Acetaminophen] Nausea and Vomiting       Past Surgical History:   Procedure Laterality Date    HX ADENOIDECTOMY      HX CHOLECYSTECTOMY      HX TONSILLECTOMY      ORAL SURGERY PROCEDURE           Review of Systems   Constitutional: Positive for malaise/fatigue. Negative for chills, fever and weight loss. HENT: Negative for congestion, ear pain and sore throat. Eyes: Positive for blurred vision. Negative for double vision and pain. Respiratory: Positive for shortness of breath. Negative for cough. Cardiovascular: Negative for chest pain and leg swelling. Gastrointestinal: Positive for abdominal pain, constipation, diarrhea, heartburn and nausea. Negative for blood in stool and vomiting. Genitourinary: Negative. Musculoskeletal: Positive for back pain, joint pain, myalgias and neck pain. Skin: Negative. Neurological: Positive for dizziness, weakness and headaches. Negative for tingling and seizures. Endo/Heme/Allergies: Negative for environmental allergies. Psychiatric/Behavioral: Positive for depression. Negative for suicidal ideas. The patient is nervous/anxious. The patient does not have insomnia. All other systems reviewed and are negative. Physical Exam   Constitutional: He is oriented to person, place, and time and well-developed, well-nourished, and in no distress. No distress. HENT:   Head: Normocephalic and atraumatic. Eyes: EOM are normal.   Neck: Normal range of motion. Pulmonary/Chest: Effort normal.   Musculoskeletal: Normal range of motion. Neurological: He is alert and oriented to person, place, and time. He has normal reflexes. Gait abnormal.   Skin: Skin is dry. No rash noted. No erythema.    Psychiatric: Mood, memory, affect and judgment normal.   Nursing note and vitals reviewed. ASSESSMENT:    1. Encounter for long-term (current) use of medications    2. Chronic pain syndrome    3. Chronic bilateral low back pain without sciatica    4. Cervicalgia    5. Pain in thoracic spine    6. Pain in joint, multiple sites        PLAN / Pt Instructions:  1. Continue current plan with no evidence of addiction or diversion. Stable on current medication without adverse events. 2. Refill Fentanyl 75 mcg every 72 hours  3. Refill MSIR 30 mg up to 4 times per day for severe pain   4. Discontinue Lyrica 50 mg.   5. Continue to follow-up with [facility]. 6. Discussed risks of addiction, dependency, and opioid induced hyperalgesia. 7. Return to clinic in 3 months     Medications Ordered Today   Medications    fentaNYL (DURAGESIC) 75 mcg/hr     Si Patch by TransDERmal route every seventy-two (72) hours for 30 days. Max Daily Amount: 1 Patch. For chronic pain. Mylan brand preferred  Indications: Chronic Pain with Opioid Tolerance, SEVERE PAIN WITH OPIOID TOLERANCE     Dispense:  10 Patch     Refill:  0    fentaNYL (DURAGESIC) 75 mcg/hr     Si Patch by TransDERmal route every seventy-two (72) hours for 30 days. Max Daily Amount: 1 Patch. For chronic pain. Mylan brand preferred  Indications: Chronic Pain with Opioid Tolerance, SEVERE PAIN WITH OPIOID TOLERANCE     Dispense:  10 Patch     Refill:  0    fentaNYL (DURAGESIC) 75 mcg/hr     Si Patch by TransDERmal route every seventy-two (72) hours for 30 days. Max Daily Amount: 1 Patch. Mylan brand preferred  Indications: Chronic Pain with Opioid Tolerance, SEVERE PAIN WITH OPIOID TOLERANCE     Dispense:  10 Patch     Refill:  0    morphine IR (MS IR) 30 mg tablet     Sig: Take 1 Tab by mouth four (4) times daily for 30 days.  Indications: Pain, Severe Pain     Dispense:  120 Tab     Refill:  0    morphine IR (MS IR) 30 mg tablet     Sig: Take 1 Tab by mouth four (4) times daily as needed for Pain for up to 30 days. Max Daily Amount: 120 mg. Indications: Pain, Severe Pain     Dispense:  120 Tab     Refill:  0    morphine IR (MS IR) 30 mg tablet     Sig: Take 1 Tab by mouth four (4) times daily as needed for Pain for up to 30 days. Max Daily Amount: 120 mg. Indications: Pain, Severe Pain     Dispense:  120 Tab     Refill:  0       Pain medications prescribed with the objective of pain relief and improved physical and psychosocial function in this patient. Spent 25 minutes with patient today reviewing the treatment plan, goals of treatment plan, and limitations of the treatment plan, to include the potential for side effects from medications and procedures. Phil Claros 3/19/2018      Note: Please excuse any typographical errors. Voice recognition software was used for this note and may cause mistakes.

## 2018-03-19 NOTE — PATIENT INSTRUCTIONS
1. Continue current plan with no evidence of addiction or diversion. Stable on current medication without adverse events. 2. Refill Fentanyl 75 mcg every 72 hours  3. Refill MSIR 30 mg up to 4 times per day for severe pain   4. Discontinue Lyrica 50 mg.   5. Continue to follow-up with vein center. 6. Discussed risks of addiction, dependency, and opioid induced hyperalgesia. 7. Return to clinic in 3 months          Learning About Benefits From Quitting Smoking  How does quitting smoking make you healthier? If you're thinking about quitting smoking, you may have a few reasons to be smoke-free. Your health may be one of them. · When you quit smoking, you lower your risks for cancer, lung disease, heart attack, stroke, blood vessel disease, and blindness from macular degeneration. · When you're smoke-free, you get sick less often, and you heal faster. You are less likely to get colds, flu, bronchitis, and pneumonia. · As a nonsmoker, you may find that your mood is better and you are less stressed. When and how will you feel healthier? Quitting has real health benefits that start from day 1 of being smoke-free. And the longer you stay smoke-free, the healthier you get and the better you feel. The first hours  · After just 20 minutes, your blood pressure and heart rate go down. That means there's less stress on your heart and blood vessels. · Within 12 hours, the level of carbon monoxide in your blood drops back to normal. That makes room for more oxygen. With more oxygen in your body, you may notice that you have more energy than when you smoked. After 2 weeks  · Your lungs start to work better. · Your risk of heart attack starts to drop. After 1 month  · When your lungs are clear, you cough less and breathe deeper, so it's easier to be active. · Your sense of taste and smell return. That means you can enjoy food more than you have since you started smoking.   Over the years  · After 1 year, your risk of heart disease is half what it would be if you kept smoking. · After 5 years, your risk of stroke starts to shrink. Within a few years after that, it's about the same as if you'd never smoked. · After 10 years, your risk of dying from lung cancer is cut by about half. And your risk for many other types of cancer is lower too. How would quitting help others in your life? When you quit smoking, you improve the health of everyone who now breathes in your smoke. · Their heart, lung, and cancer risks drop, much like yours. · They are sick less. For babies and small children, living smoke-free means they're less likely to have ear infections, pneumonia, and bronchitis. · If you're a woman who is or will be pregnant someday, quitting smoking means a healthier . · Children who are close to you are less likely to become adult smokers. Where can you learn more? Go to http://laura-bessy.info/. Enter 052 [id number] 72 11 in the search box to learn more about \"Learning About Benefits From Quitting Smoking. \"  Current as of: 2017  Content Version: 11.4  © 7278-0329 [employer]. Care instructions adapted under license by [employer] (which disclaims liability or warranty for this information). If you have questions about a medical condition or this instruction, always ask your healthcare professional. [address] any warranty or liability for your use of this information.

## 2018-03-21 ENCOUNTER — TELEPHONE (OUTPATIENT)
Dept: PAIN MANAGEMENT | Age: 63
End: 2018-03-21

## 2018-03-21 NOTE — TELEPHONE ENCOUNTER
Received a fax from [id number] [address] - fentanyl needs a pa.  Submitted pa for fentanyl 75mcg patch - approved - 3/21/18 to 9/17/18 ([id number])

## 2018-06-18 ENCOUNTER — OFFICE VISIT (OUTPATIENT)
Dept: PAIN MANAGEMENT | Age: 63
End: 2018-06-18

## 2018-06-18 VITALS
DIASTOLIC BLOOD PRESSURE: 66 MMHG | WEIGHT: 150 LBS | HEIGHT: 65 IN | BODY MASS INDEX: 24.99 KG/M2 | RESPIRATION RATE: 14 BRPM | TEMPERATURE: 97.4 F | HEART RATE: 55 BPM | SYSTOLIC BLOOD PRESSURE: 118 MMHG

## 2018-06-18 DIAGNOSIS — M54.50 CHRONIC BILATERAL LOW BACK PAIN WITHOUT SCIATICA: ICD-10-CM

## 2018-06-18 DIAGNOSIS — M54.6 PAIN IN THORACIC SPINE: ICD-10-CM

## 2018-06-18 DIAGNOSIS — M25.50 PAIN IN JOINT, MULTIPLE SITES: ICD-10-CM

## 2018-06-18 DIAGNOSIS — G89.29 CHRONIC BILATERAL LOW BACK PAIN WITHOUT SCIATICA: ICD-10-CM

## 2018-06-18 DIAGNOSIS — G89.4 CHRONIC PAIN SYNDROME: ICD-10-CM

## 2018-06-18 DIAGNOSIS — M54.2 CERVICALGIA: ICD-10-CM

## 2018-06-18 RX ORDER — FENTANYL 25 UG/1
1 PATCH TRANSDERMAL
Qty: 10 PATCH | Refills: 0 | Status: SHIPPED | OUTPATIENT
Start: 2018-08-26 | End: 2018-09-12 | Stop reason: SDUPTHER

## 2018-06-18 RX ORDER — MORPHINE SULFATE 30 MG/1
30 TABLET ORAL 4 TIMES DAILY
Qty: 120 TAB | Refills: 0 | Status: SHIPPED | OUTPATIENT
Start: 2018-08-26 | End: 2018-09-12 | Stop reason: SDUPTHER

## 2018-06-18 RX ORDER — FENTANYL 37.5 UG/H
1 PATCH, EXTENDED RELEASE TRANSDERMAL
Qty: 10 PATCH | Refills: 0 | Status: SHIPPED | OUTPATIENT
Start: 2018-07-27 | End: 2018-08-26

## 2018-06-18 RX ORDER — FENTANYL 50 UG/1
1 PATCH TRANSDERMAL
Qty: 5 PATCH | Refills: 0 | Status: SHIPPED | OUTPATIENT
Start: 2018-06-28 | End: 2018-06-18 | Stop reason: CLARIF

## 2018-06-18 RX ORDER — MORPHINE SULFATE 30 MG/1
30 TABLET ORAL
Qty: 120 TAB | Refills: 0 | Status: SHIPPED | OUTPATIENT
Start: 2018-07-27 | End: 2018-10-03 | Stop reason: SDUPTHER

## 2018-06-18 RX ORDER — GABAPENTIN 300 MG/1
300 CAPSULE ORAL 3 TIMES DAILY
Qty: 90 CAP | Refills: 1 | Status: SHIPPED | OUTPATIENT
Start: 2018-07-17 | End: 2018-08-16

## 2018-06-18 RX ORDER — FENTANYL 50 UG/1
1 PATCH TRANSDERMAL
Qty: 10 PATCH | Refills: 0 | Status: SHIPPED | OUTPATIENT
Start: 2018-06-28 | End: 2018-07-28

## 2018-06-18 RX ORDER — GABAPENTIN 300 MG/1
CAPSULE ORAL
Qty: 53 CAP | Refills: 2 | Status: SHIPPED | OUTPATIENT
Start: 2018-06-18

## 2018-06-18 RX ORDER — MORPHINE SULFATE 30 MG/1
30 TABLET ORAL
Qty: 120 TAB | Refills: 0 | Status: SHIPPED | OUTPATIENT
Start: 2018-06-28 | End: 2018-07-28

## 2018-06-18 NOTE — PROGRESS NOTES
HISTORY OF PRESENT ILLNESS  Shanta Ma is a 59 y.o. male    HPI: Mr. Donna Miller  returns today for f/u of chronic severe pain which is widespread and polyarticular related to generalized osteoarthritis. In addition, he suffers from chronic low back pain. He is here today with his wife. He continues unchanged since last visit. He was previously referred to vascular surgeon for bilateral lower extremity pain and discoloration. He reports a vascular specialist has told him that his pain in his legs are coming from his back. I have encouraged him to continue to follow-up with his vascular specialist for more workup. We discontinued gabapentin and tried Lyrica which was causing him headaches. He reports that gabapentin was previously causing stomach pains and cramps. He says it he thinks this could have been due to something else and would like to try gabapentin again. We will start at a tapering dose and he understands to discontinue medication if he has side effects again. We discussed the departure of his previous providers in detail. Explained to him that moving forward we will be focusing on more conservative and non-opioid plan of care within our practice. This will result in changes to his treatment plan. We will begin tapering his fentanyl. Please see tapering plan below. He will continue to use his morphine on an as-needed basis only. We will have him follow-up in 3 months for further evaluation and recommendation. I have asked him to call and cancel his appointment and pain management agreement if he does decide to transfer his care. Medication management consists of Fentanyl 75 mcg patch q 72 hrs and Morphine 30 mg QID PRN. No concurrent benzodiazepines. Medications are helping with pain control and quality of life. His pain is 5-6/10 with medication and 10/10 without. Pt describes pain as constant, tender, and aching. Aggravating factors include most activity.  Relieved with rest, medication, and avoiding painful activities. Current treatment is helping to improve general activity, mood, walking, sleep, enjoyment of life    Mr. Ada Chacon is tolerating medications well, with no side effects noted. He is able to stay more active with less discomfort with these current doses. In the past 30 days, the patient reports an average of 40-50% pain relief with current treatment/medications. He is informed of side effects, risks, and benefits of this regimen, and emphasizes that he derives a significant improvement in functionality and quality of life, and notes that non-opioid medications and therapies in the past have not offered significant benefit. He  is otherwise doing well with no other complaints today. He denies any current adverse events including nausea, vomiting, dizziness, constipation, hallucinations, or seizures. MME: 300    Adjusted MME: 180  COMM: 5  OSWESTRY: 52 %  PMA updated: 2017  Last UDS reviewed      PRIOR IMAGIN. Lumbar MRI 2011: L5-S1 DDD with only narrowed central canal stenosis. Mild to moderate left and mild right foraminal narrowing       Allergies   Allergen Reactions    Vicodin [Hydrocodone-Acetaminophen] Nausea and Vomiting       Past Surgical History:   Procedure Laterality Date    HX ADENOIDECTOMY      HX CHOLECYSTECTOMY      HX TONSILLECTOMY      ID UNS ORAL SURG PROC BY REPORT           Review of Systems   Constitutional: Positive for malaise/fatigue. Negative for chills, fever and weight loss. HENT: Negative for congestion, ear pain and sore throat. Eyes: Positive for blurred vision. Negative for double vision and pain. Respiratory: Positive for shortness of breath. Negative for cough. Cardiovascular: Negative for chest pain and leg swelling. Gastrointestinal: Positive for abdominal pain, constipation, diarrhea, heartburn and nausea. Negative for blood in stool and vomiting. Genitourinary: Negative.     Musculoskeletal: Positive for back pain, joint pain, myalgias and neck pain. Skin: Negative. Neurological: Positive for dizziness, weakness and headaches. Negative for tingling and seizures. Endo/Heme/Allergies: Negative for environmental allergies. Psychiatric/Behavioral: Positive for depression. Negative for suicidal ideas. The patient is nervous/anxious. The patient does not have insomnia. All other systems reviewed and are negative. Physical Exam   Constitutional: He is oriented to person, place, and time and well-developed, well-nourished, and in no distress. No distress. HENT:   Head: Normocephalic and atraumatic. Eyes: EOM are normal.   Neck: Normal range of motion. Pulmonary/Chest: Effort normal.   Musculoskeletal: Normal range of motion. Neurological: He is alert and oriented to person, place, and time. He has normal reflexes. Gait abnormal.   Skin: Skin is dry. No rash noted. No erythema. Psychiatric: Mood, memory, affect and judgment normal.   Nursing note and vitals reviewed. ASSESSMENT:    1. Chronic pain syndrome    2. Chronic bilateral low back pain without sciatica    3. Cervicalgia    4. Pain in joint, multiple sites    5. Pain in thoracic spine        PLAN / Pt Instructions:  1. Continue current plan with no evidence of addiction or diversion. Stable on current medication without adverse events. 2. Refill and adjust fentanyl 75 mcg patch down to 50 mcg patch every 72 hours ×1 month, then adjust down to 37.5 mcg patch every 72 hours ×1 month, then adjust down to 25 mg patch every 72 hours until next  visit   3. Refill MSIR 30 mg up to 4 times per day for severe pain   4. Add gabapentin 300 mg. Take 1 capsule once in the evening for one week, then 2 times a day for 3 weeks, then adjust up to every 8 hours. 5. Continue to follow-up with vein center. 6. Discussed risks of addiction, dependency, and opioid induced hyperalgesia.    Please remember to call at least 4-5 business days prior to your medication refill. Return to clinic in 3 months. Please call and cancel your appointment and pain management agreement if you do decide to transfer your care. Medications Ordered Today   Medications    morphine IR (MS IR) 30 mg tablet     Sig: Take 1 Tab by mouth four (4) times daily as needed for Pain for up to 30 days. Max Daily Amount: 120 mg. Indications: Pain, Severe Pain     Dispense:  120 Tab     Refill:  0    morphine IR (MS IR) 30 mg tablet     Sig: Take 1 Tab by mouth four (4) times daily as needed for Pain for up to 30 days. Max Daily Amount: 120 mg. Indications: Pain, Severe Pain     Dispense:  120 Tab     Refill:  0    morphine IR (MS IR) 30 mg tablet     Sig: Take 1 Tab by mouth four (4) times daily for 30 days. Indications: Pain, Severe Pain     Dispense:  120 Tab     Refill:  0    DISCONTD: fentaNYL (DURAGESIC) 50 mcg/hr PATCH     Si Patch by TransDERmal route every seventy-two (72) hours for 30 days. Max Daily Amount: 1 Patch. Dispense:  5 Patch     Refill:  0    fentaNYL 37.5 mcg/hour pt72     Si Patch by TransDERmal route every seventy-two (72) hours for 30 days. Max Daily Amount: 1 Patch. Dispense:  10 Patch     Refill:  0     Please note tapering dose    fentaNYL (DURAGESIC) 25 mcg/hr PATCH     Si Patch by TransDERmal route every seventy-two (72) hours for 30 days. Max Daily Amount: 1 Patch. Dispense:  10 Patch     Refill:  0    gabapentin (NEURONTIN) 300 mg capsule     Sig: Take 1 capsule once in the evening for one week, then 2 times a day thereafter. #53     Dispense:  53 Cap     Refill:  2    gabapentin (NEURONTIN) 300 mg capsule     Sig: Take 1 Cap by mouth three (3) times daily for 30 days. Dispense:  90 Cap     Refill:  1    fentaNYL (DURAGESIC) 50 mcg/hr PATCH     Si Patch by TransDERmal route every seventy-two (72) hours for 30 days. Max Daily Amount: 1 Patch.      Dispense:  10 Patch     Refill:  0       DISPOSITION   Pain medications are prescribed with the objective of pain relief and improved physical and psychosocial function in this patient.  Patient has been counseled on proper use of prescribed medications.  Patient has been counseled about chronic medical conditions and their relationship to anxiety and depression and recommended mental health support as needed.  Reviewed with patient self-help tools, home exercise, and lifestyle changes to assist the patient in self-management of symptoms.  Reviewed with patient the treatment plan, goals of treatment plan, and limitations of treatment plan, to include the potential for side effects from medications and procedures. If side effects occur, it is the responsibility of the patient to inform the clinic so that a change in the treatment plan can be made in a safe manner. The patient is advised that stopping prescribed medication may cause an increase in symptoms and possible medication withdrawal symptoms. The patient is informed an emergency room evaluation may be necessary if this occurs. Spent 25 minutes with patient today which more than 50% of that time was spent on counseling and coordination of care. Evelina Tipton, [address] 6/18/2018      Note: Please excuse any typographical errors. Voice recognition software was used for this note and may cause mistakes.

## 2018-06-18 NOTE — MR AVS SNAPSHOT
[address] [address] 
[phone] Patient: Zara Shell MRN: [id number] NYI:[id number] Visit Information Date & Time Provider Department Dept. Phone Encounter #  
 6/18/2018  8:00 AM [facility] [id number] Follow-up Instructions Return in about 3 months (around 9/18/2018). Upcoming Health Maintenance Date Due Hepatitis C Screening 1955 Pneumococcal 19-64 Medium Risk (1 of 1 - PPSV23) 1/29/1975 DTaP/Tdap/Td series (1 - Tdap) 1/29/1977 FOBT Q 1 YEAR AGE 50-75 1/29/2006 ZOSTER VACCINE AGE 60> 11/29/2015 Influenza Age 5 to Adult 8/1/2018 Allergies as of 6/18/2018  Review Complete On: 6/18/2018 By: CATALINA Love Severity Noted Reaction Type Reactions Vicodin [Hydrocodone-acetaminophen]    Nausea and Vomiting Current Immunizations  Never Reviewed No immunizations on file. Not reviewed this visit You Were Diagnosed With   
  
 Codes Comments Chronic pain syndrome     ICD-10-CM: G89.4 ICD-9-CM: 338. 4 Chronic bilateral low back pain without sciatica     ICD-10-CM: M54.5, G89.29 ICD-9-CM: 724.2, 338.29 Cervicalgia     ICD-10-CM: M54.2 ICD-9-CM: 723.1 Pain in joint, multiple sites     ICD-10-CM: M25.50 ICD-9-CM: 719.49 Pain in thoracic spine     ICD-10-CM: M54.6 ICD-9-CM: 724.1 Vitals BP Pulse Temp Resp Height(growth percentile) Weight(growth percentile)  
 118/66 (BP 1 Location: Right arm, BP Patient Position: Sitting) (!) 55 97.4 °F (36.3 °C) 14 5' 5\" (1.651 m) 150 lb (68 kg) BMI Smoking Status 24.96 kg/m2 Current Every Day Smoker BMI and BSA Data Body Mass Index Body Surface Area 24.96 kg/m 2 1.77 m 2 Preferred Pharmacy Pharmacy Name Phone  [facility] [address], [place] [address] [place] [phone] Your Updated Medication List  
  
   
This list is accurate as of 6/18/18  8:37 AM.  Always use your most recent med list.  
  
  
  
  
 ANDROGEL 1 % (25 mg/2.5gram) Glpk Generic drug:  testosterone 25 mg by TransDERmal route daily. cloNIDine HCl 0.1 mg tablet Commonly known as:  CATAPRES Take 1 Tab by mouth three (3) times daily. For anxiety, agitation, muscle aches, sweating, runny nose, and/or cramping. COMBIVENT  mcg/actuation inhaler Generic drug:  ipratropium-albuterol Take 2 Puffs by inhalation every six (6) hours as needed for Wheezing. CYMBALTA 60 mg capsule Generic drug:  DULoxetine Take 60 mg by mouth daily. ergocalciferol 50,000 unit capsule Commonly known as:  ERGOCALCIFEROL Take 1 Cap by mouth every seven (7) days. * fentaNYL 75 mcg/hr Commonly known as:  DURAGESIC  
1 Patch by TransDERmal route every seventy-two (72) hours for 30 days. Max Daily Amount: 1 Patch. Mylan brand preferred  Indications: Chronic Pain with Opioid Tolerance, SEVERE PAIN WITH OPIOID TOLERANCE  
  
 * fentaNYL 50 mcg/hr PATCH Commonly known as:  DURAGESIC  
1 Patch by TransDERmal route every seventy-two (72) hours for 30 days. Max Daily Amount: 1 Patch. Start taking on:  6/28/2018 * fentaNYL 37.5 mcg/hour Pt72  
1 Patch by TransDERmal route every seventy-two (72) hours for 30 days. Max Daily Amount: 1 Patch. Start taking on:  7/27/2018 * fentaNYL 25 mcg/hr PATCH Commonly known as:  DURAGESIC  
1 Patch by TransDERmal route every seventy-two (72) hours for 30 days. Max Daily Amount: 1 Patch. Start taking on:  8/26/2018  
  
 frovatriptan 2.5 mg tablet Commonly known as:  Larayne Slay Take 1 Tab by mouth once as needed for Migraine for up to 1 dose. * gabapentin 300 mg capsule Commonly known as:  NEURONTIN Take 1 capsule once in the evening for one week, then 2 times a day thereafter. #53 * gabapentin 300 mg capsule Commonly known as:  NEURONTIN Take 1 Cap by mouth three (3) times daily for 30 days. Start taking on:  7/17/2018 LORazepam 1 mg tablet Commonly known as:  ATIVAN Take 0.5-1 Tabs by mouth three (3) times daily as needed for Anxiety. Max Daily Amount: 3 mg. Indications: ANXIETY * morphine IR 30 mg tablet Commonly known as:  MS IR Take 1 Tab by mouth four (4) times daily as needed for Pain for up to 30 days. Max Daily Amount: 120 mg. Indications: Pain, Severe Pain Start taking on:  6/28/2018 * morphine IR 30 mg tablet Commonly known as:  MS IR Take 1 Tab by mouth four (4) times daily as needed for Pain for up to 30 days. Max Daily Amount: 120 mg. Indications: Pain, Severe Pain Start taking on:  7/27/2018 * morphine IR 30 mg tablet Commonly known as:  MS IR Take 1 Tab by mouth four (4) times daily for 30 days. Indications: Pain, Severe Pain Start taking on:  8/26/2018  
  
 naloxone 4 mg/actuation nasal spray Commonly known as:  NARCAN  
4 mg by Nasal route as needed for up to 2 doses. Indications: OPIOID TOXICITY  
  
 pregabalin 50 mg capsule Commonly known as:  [place] Take 1 Cap by mouth every twelve (12) hours. Max Daily Amount: 100 mg.  
  
 promethazine 25 mg tablet Commonly known as:  PHENERGAN Take 1 Tab by mouth two (2) times a day. Prn nausea/vomiting PROTONIX 40 mg tablet Generic drug:  pantoprazole Take 40 mg by mouth daily. traZODone 150 mg tablet Commonly known as:  Mickie Tyler Take 150 mg by mouth nightly. * Notice: This list has 9 medication(s) that are the same as other medications prescribed for you. Read the directions carefully, and ask your doctor or other care provider to review them with you. Prescriptions Printed Refills  
 morphine IR (MS IR) 30 mg tablet 0 Starting on: 6/28/2018  Sig: Take 1 Tab by mouth four (4) times daily as needed for Pain for up to 30 days. Max Daily Amount: 120 mg. Indications: Pain, Severe Pain Class: Print Route: Oral  
 morphine IR (MS IR) 30 mg tablet 0 Starting on: 2018 Sig: Take 1 Tab by mouth four (4) times daily as needed for Pain for up to 30 days. Max Daily Amount: 120 mg. Indications: Pain, Severe Pain Class: Print Route: Oral  
 morphine IR (MS IR) 30 mg tablet 0 Starting on: 2018 Sig: Take 1 Tab by mouth four (4) times daily for 30 days. Indications: Pain, Severe Pain Class: Print Route: Oral  
 fentaNYL (DURAGESIC) 50 mcg/hr PATCH 0 Starting on: 2018 Si Patch by TransDERmal route every seventy-two (72) hours for 30 days. Max Daily Amount: 1 Patch. Class: Print Route: TransDERmal  
 fentaNYL 37.5 mcg/hour pt72 0 Starting on: 2018 Si Patch by TransDERmal route every seventy-two (72) hours for 30 days. Max Daily Amount: 1 Patch. Class: Print Route: TransDERmal  
 fentaNYL (DURAGESIC) 25 mcg/hr PATCH 0 Starting on: 2018 Si Patch by TransDERmal route every seventy-two (72) hours for 30 days. Max Daily Amount: 1 Patch. Class: Print Route: TransDERmal  
  
Prescriptions Sent to Pharmacy Refills  
 gabapentin (NEURONTIN) 300 mg capsule 2 Sig: Take 1 capsule once in the evening for one week, then 2 times a day thereafter. #53 Class: Normal  
 Pharmacy: [facility] [address], [address] Ph #: [phone]  
 gabapentin (NEURONTIN) 300 mg capsule 1 Starting on: 2018 Sig: Take 1 Cap by mouth three (3) times daily for 30 days. Class: Normal  
 Pharmacy: [facility] [address], [address] Ph #: [phone] Route: Oral  
  
Follow-up Instructions Return in about 3 months (around 2018). Patient Instructions 1. Continue current plan with no evidence of addiction or diversion. Stable on current medication without adverse events. 2. Refill Fentanyl 75 mcg every 72 hours 3. Refill MSIR 30 mg up to 4 times per day for severe pain 4. Add gabapentin 300 mg. Take 1 capsule once in the evening for one week, then 2 times a day for 3 weeks, then adjust up to every 8 hours. 5. Continue to follow-up with vein center. 6. Discussed risks of addiction, dependency, and opioid induced hyperalgesia. Please remember to call at least 4-5 business days prior to your medication refill. Return to clinic in 3 months. Please call and cancel your appointment and pain management agreement if you do decide to transfer your care. Introducing [facility]! Lito Funk introduces [facility] patient portal. Now you can access parts of your medical record, email your doctor's office, and request medication refills online. 1. In your internet browser, go to [email or web address] 2. Click on the First Time User? Click Here link in the Sign In box. You will see the New Member Sign Up page. 3. Enter your [employer] Access Code exactly as it appears below. You will not need to use this code after youve completed the sign-up process. If you do not sign up before the expiration date, you must request a new code. · [employer] Access Code: [email or web address] Expires: 9/16/2018  8:37 AM 
 
4. Enter the last four digits of your Social Security Number (xxxx) and Date of Birth (mm/dd/yyyy) as indicated and click Submit. You will be taken to the next sign-up page. 5. Create a [facility] ID. This will be your [facility] login ID and cannot be changed, so think of one that is secure and easy to remember. 6. Create a [facility] password. You can change your password at any time. 7. Enter your Password Reset Question and Answer. This can be used at a later time if you forget your password. 8. Enter your e-mail address. You will receive e-mail notification when new information is available in [address] 9. Click Sign Up. You can now view and download portions of your medical record. 10. Click the Download Summary menu link to download a portable copy of your medical information. If you have questions, please visit the Frequently Asked Questions section of the [email or web address] website. Remember, [email or web address] is NOT to be used for urgent needs. For medical emergencies, dial 911. Now available from your iPhone and Android! Please provide this summary of care documentation to your next provider. Your primary care clinician is listed as Jackie Lopez. If you have any questions after today's visit, please call [phone].

## 2018-06-18 NOTE — PATIENT INSTRUCTIONS
1. Continue current plan with no evidence of addiction or diversion. Stable on current medication without adverse events. 2. Refill and adjust fentanyl 75 mcg patch down to 50 mcg patch every 72 hours ×1 month, then adjust down to 37.5 mcg patch every 72 hours ×1 month, then adjust down to 25 mg patch every 72 hours until next  visit   3. Refill MSIR 30 mg up to 4 times per day for severe pain   4. Add gabapentin 300 mg. Take 1 capsule once in the evening for one week, then 2 times a day for 3 weeks, then adjust up to every 8 hours. 5. Continue to follow-up with vein center. 6. Discussed risks of addiction, dependency, and opioid induced hyperalgesia. Please remember to call at least 4-5 business days prior to your medication refill. Return to clinic in 3 months. Please call and cancel your appointment and pain management agreement if you do decide to transfer your care.

## 2018-06-18 NOTE — PROGRESS NOTES
Nursing Notes    Patient presents to the office today in follow-up. Patient rates his pain at 5/10 on the numerical pain scale. Reviewed medications with counts as follows:    Rx Date filled Qty Dispensed Pill Count Last Dose Short   Ms. Contin 30 mg IR 05/29/18 120 43 This am  no   Fentanyl 75 mcg 05/29/18 10 3+1 on This am  no         Comments: Patient is here today for a follow up today he states his pain level today is a 5      POC UDS was not performed in office today    Any new labs or imaging since last appointment? NO    Have you been to an emergency room (ER) or urgent care clinic since your last visit? NO            Have you been hospitalized since your last visit? NO     If yes, where, when, and reason for visit? Have you seen or consulted any other health care providers outside of the [address] [place] [address]  since your last visit? NO     If yes, where, when, and reason for visit? Mr. Дмитрий Fraser has a reminder for a \"due or due soon\" health maintenance. I have asked that he contact his primary care provider for follow-up on this health maintenance.

## 2018-06-29 ENCOUNTER — TELEPHONE (OUTPATIENT)
Dept: PAIN MANAGEMENT | Age: 63
End: 2018-06-29

## 2018-06-29 NOTE — TELEPHONE ENCOUNTER
Daughter called re pa for phenergan. Called pt - spoke with wife to clarify correct pt as no  was left. Also clarified medication pa needed - fentanyl not phenergan. Wife said pa is on file until Sept. Explained with any changes new pa is needed Tried submitting pa through cover my meds - said pa on file. Called [facility] - spoke with Shobha Zuniga - said since there is an active pa on file she can add the different dosage. Added 50mcg #10/30. Asked Shobha Zuniga if I need to call back in Aug as his script will be for 25mcg. Shobha Zuniga added the 25mcg #10/30 to the pa and asked if there was another dosage needed. Explained I don't know if the provider will go down to 12mcg or pt will dc the med after 25mcg. Shobha Zuniga said she would add the 12mcg patch also so we don't have to call back if it is ordered before the pa runs out in Sept. Pa expires 18 next appt is 18. Called pt - spoke with wife - explained what I was told by [facility]. She understood.

## 2018-07-20 ENCOUNTER — TELEPHONE (OUTPATIENT)
Dept: PAIN MANAGEMENT | Age: 63
End: 2018-07-20

## 2018-07-20 ENCOUNTER — DOCUMENTATION ONLY (OUTPATIENT)
Dept: PAIN MANAGEMENT | Age: 63
End: 2018-07-20

## 2018-07-20 NOTE — TELEPHONE ENCOUNTER
Hoa Bourne has called requesting a refill of their controlled medication, Morphine 30 mg and Fentanyl 37.5 mcg, for the management of Chronic bilateral low back pain without sciatica. Last office visit date: 6/18/18    Date last  was pulled and reviewed : 7/20/18 and compliant. Last filled 6/28/18 and 6/29/18    Was the patient compliant when the above report was pulled? yes    Analgesia: Patient report 40% of pain relief with current medication regimen    Aberrancies: No aberrancies in the last 30 days. ADL's: Patient report he is able to do basic ADL's at home. Adverse Reaction:Patient report no adverse reaction. Provider's last note and plan of care reviewed? yes  Request forwarded to provider for review.

## 2018-08-16 ENCOUNTER — DOCUMENTATION ONLY (OUTPATIENT)
Dept: PAIN MANAGEMENT | Age: 63
End: 2018-08-16

## 2018-08-16 ENCOUNTER — TELEPHONE (OUTPATIENT)
Dept: PAIN MANAGEMENT | Age: 63
End: 2018-08-16

## 2018-08-16 NOTE — TELEPHONE ENCOUNTER
Noa Carlton has called requesting a refill of their controlled medication, Morphine 30 mg and Fentanyl 25 mcg, for the management of Chronic bilateral low back pain without sciatica. Last office visit date: 6/18/18    Date last  was pulled and reviewed : 8/16/18 and compliant. Last filled 7/25/18 and 7/26/18    Was the patient compliant when the above report was pulled? yes    Analgesia: Patient report 50% of pain relief with current medication regimen    Aberrancies: No aberrancies in the last 30 days. ADL's: Patient report he is not able to do basic ADL's at home due to increase pain. Adverse Reaction:Patient report no adverse reaction. Provider's last note and plan of care reviewed? yes  Request forwarded to provider for review. Provider was made aware.

## 2018-09-12 DIAGNOSIS — M25.50 PAIN IN JOINT, MULTIPLE SITES: ICD-10-CM

## 2018-09-12 DIAGNOSIS — M54.50 CHRONIC BILATERAL LOW BACK PAIN WITHOUT SCIATICA: ICD-10-CM

## 2018-09-12 DIAGNOSIS — M54.2 CERVICALGIA: ICD-10-CM

## 2018-09-12 DIAGNOSIS — G89.29 CHRONIC BILATERAL LOW BACK PAIN WITHOUT SCIATICA: ICD-10-CM

## 2018-09-12 DIAGNOSIS — G89.4 CHRONIC PAIN SYNDROME: ICD-10-CM

## 2018-09-12 RX ORDER — MORPHINE SULFATE 30 MG/1
30 TABLET ORAL 4 TIMES DAILY
Qty: 120 TAB | Refills: 0 | Status: SHIPPED | OUTPATIENT
Start: 2018-09-24 | End: 2018-10-03 | Stop reason: SDUPTHER

## 2018-09-12 RX ORDER — FENTANYL 25 UG/1
1 PATCH TRANSDERMAL
Qty: 10 PATCH | Refills: 0 | Status: SHIPPED | OUTPATIENT
Start: 2018-09-24 | End: 2018-10-03 | Stop reason: SDUPTHER

## 2018-09-12 NOTE — TELEPHONE ENCOUNTER
Will Cunningham has called requesting a refill of their controlled medication, fentanyl and morphine sulfate IR, for the management of his chronic neck and joint pain . Last office visit date: 06/18/18    Date last  was pulled and reviewed : 09/12/18, last fill date for both meds was 08/26/18    Was the patient compliant when the above report was pulled? yes    Analgesia: pt reports a 10-20% pain relief with his current opioid medication regimen    Aberrancies: none noted     ADL's: pt reports being able to perform his daily duties some days    Adverse Reaction: none reported by pt at this time    Provider's last note and plan of care reviewed? yes  Request forwarded to provider for review.

## 2018-09-24 ENCOUNTER — TELEPHONE (OUTPATIENT)
Dept: PAIN MANAGEMENT | Age: 63
End: 2018-09-24

## 2018-09-24 NOTE — TELEPHONE ENCOUNTER
[facility] approved patient's PA for their Fentanyl 25 mcg/hr patches. Approved date is valid from 9/24/18 to 3/23/19. Attempted to call patient at both numbers provided in patient's chart but could not reach patient at either number, nor was there a voicemail to leave a message.

## 2018-09-24 NOTE — TELEPHONE ENCOUNTER
Sent PA for patient's Fentanyl 25 mcg/hr patch to [employer] via "[employer] Currently waiting for response.

## 2018-10-03 ENCOUNTER — OFFICE VISIT (OUTPATIENT)
Dept: PAIN MANAGEMENT | Age: 63
End: 2018-10-03

## 2018-10-03 VITALS
TEMPERATURE: 97.5 F | WEIGHT: 152 LBS | DIASTOLIC BLOOD PRESSURE: 74 MMHG | BODY MASS INDEX: 25.33 KG/M2 | HEIGHT: 65 IN | RESPIRATION RATE: 14 BRPM | HEART RATE: 59 BPM | SYSTOLIC BLOOD PRESSURE: 140 MMHG

## 2018-10-03 DIAGNOSIS — Z79.899 ENCOUNTER FOR LONG-TERM (CURRENT) USE OF HIGH-RISK MEDICATION: Primary | ICD-10-CM

## 2018-10-03 DIAGNOSIS — G89.4 CHRONIC PAIN SYNDROME: ICD-10-CM

## 2018-10-03 DIAGNOSIS — M54.50 CHRONIC BILATERAL LOW BACK PAIN WITHOUT SCIATICA: ICD-10-CM

## 2018-10-03 DIAGNOSIS — M25.50 PAIN IN JOINT, MULTIPLE SITES: ICD-10-CM

## 2018-10-03 DIAGNOSIS — M54.2 CERVICALGIA: ICD-10-CM

## 2018-10-03 DIAGNOSIS — G89.29 CHRONIC BILATERAL LOW BACK PAIN WITHOUT SCIATICA: ICD-10-CM

## 2018-10-03 LAB
ALCOHOL UR POC: NORMAL
AMPHETAMINES UR POC: NEGATIVE
BARBITURATES UR POC: NORMAL
BENZODIAZEPINES UR POC: NEGATIVE
BUPRENORPHINE UR POC: NEGATIVE
CANNABINOIDS UR POC: NORMAL
CARISOPRODOL UR POC: NORMAL
COCAINE UR POC: NEGATIVE
FENTANYL UR POC: NORMAL
MDMA/ECSTASY UR POC: NORMAL
METHADONE UR POC: NEGATIVE
METHAMPHETAMINE UR POC: NORMAL
METHYLPHENIDATE UR POC: NORMAL
OPIATES UR POC: NORMAL
OXYCODONE UR POC: NORMAL
PHENCYCLIDINE UR POC: NORMAL
PROPOXYPHENE UR POC: NORMAL
TRAMADOL UR POC: NORMAL
TRICYCLICS UR POC: NORMAL

## 2018-10-03 RX ORDER — MORPHINE SULFATE 30 MG/1
30 TABLET ORAL 4 TIMES DAILY
Qty: 120 TAB | Refills: 0 | Status: SHIPPED | OUTPATIENT
Start: 2018-10-22 | End: 2018-11-21

## 2018-10-03 RX ORDER — MORPHINE SULFATE 30 MG/1
30 TABLET ORAL
Qty: 120 TAB | Refills: 0 | Status: SHIPPED | OUTPATIENT
Start: 2018-12-20 | End: 2019-01-19

## 2018-10-03 RX ORDER — MORPHINE SULFATE 30 MG/1
30 TABLET ORAL 4 TIMES DAILY
Qty: 120 TAB | Refills: 0 | Status: SHIPPED | OUTPATIENT
Start: 2018-11-21 | End: 2018-12-21

## 2018-10-03 RX ORDER — FENTANYL 12.5 UG/1
1 PATCH TRANSDERMAL
Qty: 10 PATCH | Refills: 0 | Status: SHIPPED | OUTPATIENT
Start: 2018-11-23 | End: 2018-12-23

## 2018-10-03 RX ORDER — FENTANYL 25 UG/1
1 PATCH TRANSDERMAL
Qty: 10 PATCH | Refills: 0 | Status: SHIPPED | OUTPATIENT
Start: 2018-10-24 | End: 2018-11-23

## 2018-10-03 NOTE — MR AVS SNAPSHOT
[address] [id number] 
[phone] Patient: Adrian Read MRN: [id number] EED:[birth year] Visit Information Date & Time Provider Department Dept. Phone Encounter #  
 10/3/2018  8:00 AM Domenica Page, [address] [place] [address] for Pain Management [id number] Follow-up Instructions Return in about 3 months (around 1/3/2019). Upcoming Health Maintenance Date Due Hepatitis C Screening 1955 Pneumococcal 19-64 Medium Risk (1 of 1 - PPSV23) 1/29/1975 DTaP/Tdap/Td series (1 - Tdap) 1/29/1977 Shingrix Vaccine Age 50> (1 of 2) 1/29/2006 FOBT Q 1 YEAR AGE 50-75 1/29/2006 Influenza Age 5 to Adult 8/1/2018 Allergies as of 10/3/2018  Review Complete On: 10/3/2018 By: CATALINA Escobar Severity Noted Reaction Type Reactions Vicodin [Hydrocodone-acetaminophen]    Nausea and Vomiting Current Immunizations  Never Reviewed No immunizations on file. Not reviewed this visit You Were Diagnosed With   
  
 Codes Comments Encounter for long-term (current) use of high-risk medication    -  Primary ICD-10-CM: N26.187 ICD-9-CM: V58.69 Chronic pain syndrome     ICD-10-CM: G89.4 ICD-9-CM: 338. 4 Chronic bilateral low back pain without sciatica     ICD-10-CM: M54.5, G89.29 ICD-9-CM: 724.2, 338.29 Cervicalgia     ICD-10-CM: M54.2 ICD-9-CM: 723.1 Pain in joint, multiple sites     ICD-10-CM: M25.50 ICD-9-CM: 719.49 Vitals BP Pulse Temp Resp Height(growth percentile) Weight(growth percentile) 140/74 (BP 1 Location: Left arm, BP Patient Position: Sitting) (!) 59 97.5 °F (36.4 °C) (Oral) 14 5' 5\" (1.651 m) 152 lb (68.9 kg) BMI Smoking Status 25.29 kg/m2 Current Every Day Smoker Vitals History BMI and BSA Data Body Mass Index Body Surface Area  
 25.29 kg/m 2 1.78 m 2 Preferred Pharmacy Pharmacy Name Phone [id number] [address], [place] [phone] [facility] [phone] Your Updated Medication List  
  
   
This list is accurate as of 10/3/18  8:26 AM.  Always use your most recent med list.  
  
  
  
  
 ANDROGEL 1 % (25 mg/2.5gram) Glpk Generic drug:  testosterone 25 mg by TransDERmal route daily. cloNIDine HCl 0.1 mg tablet Commonly known as:  CATAPRES Take 1 Tab by mouth three (3) times daily. For anxiety, agitation, muscle aches, sweating, runny nose, and/or cramping. COMBIVENT  mcg/actuation inhaler Generic drug:  ipratropium-albuterol Take 2 Puffs by inhalation every six (6) hours as needed for Wheezing. CYMBALTA 60 mg capsule Generic drug:  DULoxetine Take 60 mg by mouth daily. ergocalciferol 50,000 unit capsule Commonly known as:  ERGOCALCIFEROL Take 1 Cap by mouth every seven (7) days. * fentaNYL 25 mcg/hr PATCH Commonly known as:  DURAGESIC  
1 Patch by TransDERmal route every seventy-two (72) hours for 30 days. Max Daily Amount: 1 Patch. Start taking on:  10/24/2018 * fentaNYL 12 mcg/hr patch Commonly known as:  DURAGESIC  
1 Patch by TransDERmal route every seventy-two (72) hours for 30 days. Max Daily Amount: 1 Patch. Start taking on:  11/23/2018  
  
 frovatriptan 2.5 mg tablet Commonly known as:  Walker Cruz Take 1 Tab by mouth once as needed for Migraine for up to 1 dose. gabapentin 300 mg capsule Commonly known as:  NEURONTIN Take 1 capsule once in the evening for one week, then 2 times a day thereafter. #53 LORazepam 1 mg tablet Commonly known as:  ATIVAN Take 0.5-1 Tabs by mouth three (3) times daily as needed for Anxiety. Max Daily Amount: 3 mg. Indications: ANXIETY * morphine IR 30 mg tablet Commonly known as:  MS IR Take 1 Tab by mouth four (4) times daily for 30 days. Indications: Pain, Severe Pain Start taking on:  10/22/2018 * morphine IR 30 mg tablet Commonly known as:  MS IR Take 1 Tab by mouth four (4) times daily for 30 days. Indications: Pain, Severe Pain Start taking on:  11/21/2018 * morphine IR 30 mg tablet Commonly known as:  MS IR Take 1 Tab by mouth four (4) times daily as needed for Pain for up to 30 days. Max Daily Amount: 120 mg. Indications: Pain, Severe Pain Start taking on:  12/20/2018  
  
 naloxone 4 mg/actuation nasal spray Commonly known as:  NARCAN  
4 mg by Nasal route as needed for up to 2 doses. Indications: OPIOID TOXICITY  
  
 pregabalin 50 mg capsule Commonly known as:  Hedwig Jones Take 1 Cap by mouth every twelve (12) hours. Max Daily Amount: 100 mg.  
  
 promethazine 25 mg tablet Commonly known as:  PHENERGAN Take 1 Tab by mouth two (2) times a day. Prn nausea/vomiting PROTONIX 40 mg tablet Generic drug:  pantoprazole Take 40 mg by mouth daily. traZODone 150 mg tablet Commonly known as:  Phylliss [place] Take 150 mg by mouth nightly. * Notice: This list has 5 medication(s) that are the same as other medications prescribed for you. Read the directions carefully, and ask your doctor or other care provider to review them with you. Prescriptions Printed Refills  
 morphine IR (MS IR) 30 mg tablet 0 Starting on: 10/22/2018 Sig: Take 1 Tab by mouth four (4) times daily for 30 days. Indications: Pain, Severe Pain Class: Print Route: Oral  
 morphine IR (MS IR) 30 mg tablet 0 Starting on: 11/21/2018 Sig: Take 1 Tab by mouth four (4) times daily for 30 days. Indications: Pain, Severe Pain Class: Print Route: Oral  
 morphine IR (MS IR) 30 mg tablet 0 Starting on: 12/20/2018 Sig: Take 1 Tab by mouth four (4) times daily as needed for Pain for up to 30 days. Max Daily Amount: 120 mg. Indications: Pain, Severe Pain Class: Print Route: Oral  
 fentaNYL (DURAGESIC) 25 mcg/hr PATCH 0 Starting on: 10/24/2018 Si Patch by TransDERmal route every seventy-two (72) hours for 30 days. Max Daily Amount: 1 Patch. Class: Print Route: TransDERmal  
 fentaNYL (DURAGESIC) 12 mcg/hr patch 0 Starting on: 2018 Si Patch by TransDERmal route every seventy-two (72) hours for 30 days. Max Daily Amount: 1 Patch. Class: Print Route: TransDERmal  
  
We Performed the Following AMB POC DRUG SCREEN () [ [facility]] DRUG SCREEN [[id number] Custom] Follow-up Instructions Return in about 3 months (around 1/3/2019). Patient Instructions 1. Modify current plan with no evidence of addiction or diversion. Stable on current medication without adverse events. 2. Refill and adjust fentanyl 25 mg patch every 72 hours times 1 month, then adjust down to 12 mcg patch every 72 hours x 1 month, then discontinue 3. Refill MSIR 30 mg up to 4 times per day as needed 4. Discontinue gabapentin 300 mg.  
5. Discussed risks of addiction, dependency, and opioid induced hyperalgesia. Please remember to call at least 5 business days prior to your medication refill. Return to clinic in 3 months. Please call and cancel your appointment and pain management agreement if you do decide to transfer your care. Introducing [facility]! Moe Davis introduces [employer] patient portal. Now you can access parts of your medical record, email your doctor's office, and request medication refills online. 1. In your internet browser, go to [email or web address] 2. Click on the First Time User? Click Here link in the Sign In box. You will see the New Member Sign Up page. 3. Enter your [employer] Access Code exactly as it appears below. You will not need to use this code after youve completed the sign-up process. If you do not sign up before the expiration date, you must request a new code. · [employer] Access Code: [email or web address] Expires: 2019  8:26 AM 
 
 4. Enter the last four digits of your Social Security Number (xxxx) and Date of Birth (mm/dd/yyyy) as indicated and click Submit. You will be taken to the next sign-up page. 5. Create a zhouwu ID. This will be your zhouwu login ID and cannot be changed, so think of one that is secure and easy to remember. 6. Create a zhouwu password. You can change your password at any time. 7. Enter your Password Reset Question and Answer. This can be used at a later time if you forget your password. 8. Enter your e-mail address. You will receive e-mail notification when new information is available in [address] 9. Click Sign Up. You can now view and download portions of your medical record. 10. Click the Download Summary menu link to download a portable copy of your medical information. If you have questions, please visit the Frequently Asked Questions section of the zhouwu website. Remember, zhouwu is NOT to be used for urgent needs. For medical emergencies, dial 911. Now available from your iPhone and Android! Please provide this summary of care documentation to your next provider. Your primary care clinician is listed as Jackie Lopez. If you have any questions after today's visit, please call [phone].

## 2018-10-03 NOTE — PROGRESS NOTES
HISTORY OF PRESENT ILLNESS  Paula Sultana is a 59 y.o. male    HPI: Mr. Armin Hughes  returns today for f/u of chronic severe pain which is widespread and polyarticular related to generalized osteoarthritis. In addition, he suffers from chronic low back pain. He is here today with his wife. He continues unchanged since last visit. We did begin tapering his fentanyl recently and he has been doing well with the taper so far but does complain of increased pain. we will continue tapering fentanyl as previously discussed. Please see tapering plan below. We will continue to work on a comprehensive and conservative treatment plan moving forward. He will continue to use his morphine on an as-needed basis for now. We added gabapentin last visit but still complains of stomach pains and cramps with this medication. This will be discontinued today. He has expressed interest  in transferring his care as well but has not made a formal decision at this time. I have reminded him to call and cancel his appointment and pain management agreement if he does decide to transfer his care. I continue to encourage him to follow-up with vascular specialist as well. Medication management consists of Fentanyl 75 mcg patch q 72 hrs and Morphine 30 mg QID PRN. No concurrent benzodiazepines. Medications are helping with pain control and quality of life. His pain is 5-6/10 with medication and 10/10 without. Pt describes pain as constant, tender, and aching. Aggravating factors include most activity. Relieved with rest, medication, and avoiding painful activities. Current treatment is helping to improve general activity, mood, walking, sleep, enjoyment of life    Mr. Armin Hughes is tolerating medications well, with no side effects noted. He is able to stay more active with less discomfort with these current doses. In the past 30 days, the patient reports an average of 40-50% pain relief with current treatment/medications.    He is informed of side effects, risks, and benefits of this regimen, and emphasizes that he derives a significant improvement in functionality and quality of life, and notes that non-opioid medications and therapies in the past have not offered significant benefit. He  is otherwise doing well with no other complaints today. He denies any current adverse events including nausea, vomiting, dizziness, constipation, hallucinations, or seizures. MME: 180  COMM: 7  OSWESTRY: 78 %  PMA updated: 2017  POC UDS today. Confirmation pending. PRIOR IMAGIN. Lumbar MRI 2011: L5-S1 DDD with only narrowed central canal stenosis. Mild to moderate left and mild right foraminal narrowing       Allergies   Allergen Reactions    Vicodin [Hydrocodone-Acetaminophen] Nausea and Vomiting       Past Surgical History:   Procedure Laterality Date    HX ADENOIDECTOMY      HX CHOLECYSTECTOMY      HX TONSILLECTOMY      OH UNS ORAL SURG PROC BY REPORT           Review of Systems   Constitutional: Positive for malaise/fatigue. Negative for chills, fever and weight loss. HENT: Negative for congestion, ear pain and sore throat. Eyes: Positive for blurred vision. Negative for double vision and pain. Respiratory: Positive for shortness of breath. Negative for cough. Cardiovascular: Negative for chest pain and leg swelling. Gastrointestinal: Positive for abdominal pain, constipation, diarrhea, heartburn and nausea. Negative for blood in stool and vomiting. Genitourinary: Negative. Musculoskeletal: Positive for back pain, joint pain, myalgias and neck pain. Skin: Negative. Neurological: Positive for dizziness, weakness and headaches. Negative for tingling and seizures. Endo/Heme/Allergies: Negative for environmental allergies. Psychiatric/Behavioral: Positive for depression. Negative for suicidal ideas. The patient is nervous/anxious. The patient does not have insomnia.     All other systems reviewed and are negative. Physical Exam   Constitutional: He is oriented to person, place, and time and well-developed, well-nourished, and in no distress. No distress. HENT:   Head: Normocephalic and atraumatic. Eyes: EOM are normal.   Neck: Normal range of motion. Pulmonary/Chest: Effort normal.   Musculoskeletal: Normal range of motion. Neurological: He is alert and oriented to person, place, and time. He has normal reflexes. Gait abnormal.   Skin: Skin is dry. No rash noted. No erythema. Psychiatric: Mood, memory, affect and judgment normal.   Nursing note and vitals reviewed. ASSESSMENT:    1. Encounter for long-term (current) use of high-risk medication    2. Chronic pain syndrome    3. Chronic bilateral low back pain without sciatica    4. Cervicalgia    5. Pain in joint, multiple sites        PLAN / Pt Instructions:  1. Modify current plan with no evidence of addiction or diversion. Stable on current medication without adverse events. 2. Refill fentanyl 25 mcg patch every 72 hours x 1 month, then adjust down to 12 mcg patch every 72 hours x 1 month, then discontinue  3. Refill MSIR 30 mg up to 4 times per day as needed   4. Discontinue gabapentin 300 mg.   5. Please follow-up with vascular specialist  6. Discussed risks of addiction, dependency, and opioid induced hyperalgesia. Please remember to call at least 5 business days prior to your medication refill. Return to clinic in 3 months. Please call and cancel your appointment and pain management agreement if you do decide to transfer your care. Medications Ordered Today   Medications    morphine IR (MS IR) 30 mg tablet     Sig: Take 1 Tab by mouth four (4) times daily for 30 days. Indications: Pain, Severe Pain     Dispense:  120 Tab     Refill:  0    morphine IR (MS IR) 30 mg tablet     Sig: Take 1 Tab by mouth four (4) times daily for 30 days.  Indications: Pain, Severe Pain     Dispense:  120 Tab     Refill:  0    morphine IR (MS IR) 30 mg tablet     Sig: Take 1 Tab by mouth four (4) times daily as needed for Pain for up to 30 days. Max Daily Amount: 120 mg. Indications: Pain, Severe Pain     Dispense:  120 Tab     Refill:  0    fentaNYL (DURAGESIC) 25 mcg/hr PATCH     Si Patch by TransDERmal route every seventy-two (72) hours for 30 days. Max Daily Amount: 1 Patch. Dispense:  10 Patch     Refill:  0    fentaNYL (DURAGESIC) 12 mcg/hr patch     Si Patch by TransDERmal route every seventy-two (72) hours for 30 days. Max Daily Amount: 1 Patch. Dispense:  10 Patch     Refill:  0       DISPOSITION   Pain medications are prescribed with the objective of pain relief and improved physical and psychosocial function in this patient.  Patient has been counseled on proper use of prescribed medications.  Patient has been counseled about chronic medical conditions and their relationship to anxiety and depression and recommended mental health support as needed.  Reviewed with patient self-help tools, home exercise, and lifestyle changes to assist the patient in self-management of symptoms.  Reviewed with patient the treatment plan, goals of treatment plan, and limitations of treatment plan, to include the potential for side effects from medications and procedures. If side effects occur, it is the responsibility of the patient to inform the clinic so that a change in the treatment plan can be made in a safe manner. The patient is advised that stopping prescribed medication may cause an increase in symptoms and possible medication withdrawal symptoms. The patient is informed an emergency room evaluation may be necessary if this occurs. Spent 25 minutes with patient today which more than 50% of that time was spent on counseling and coordination of care. Leopoldo Armor, [place] 10/3/2018      Note: Please excuse any typographical errors. Voice recognition software was used for this note and may cause mistakes.

## 2018-10-03 NOTE — PATIENT INSTRUCTIONS
1. Modify current plan with no evidence of addiction or diversion. Stable on current medication without adverse events. 2. Refill fentanyl 25 mcg patch every 72 hours x 1 month, then adjust down to 12 mcg patch every 72 hours x 1 month, then discontinue  3. Refill MSIR 30 mg up to 4 times per day as needed   4. Discontinue gabapentin 300 mg.   5. Please follow-up with vascular specialist  6. Discussed risks of addiction, dependency, and opioid induced hyperalgesia. Please remember to call at least 5 business days prior to your medication refill. Return to clinic in 3 months.  Please call and cancel your appointment and pain management agreement if you do decide to transfer your care

## 2018-10-03 NOTE — PROGRESS NOTES
Nursing Notes    Patient presents to the office today in follow-up. Patient rates his pain at 8/10 on the numerical pain scale. Reviewed medications with counts as follows:    Rx Date filled Qty Dispensed Pill Count Last Dose Short   Fentanyl 25 mcg 9/25/18 10 8+1on 10/10/18 no   Morphine IR 30 mg 9/23/18 120 77 today no                     Last opioid agreement 12/28/17  Last urine drug screen 4/17/18  PHQ over the last two weeks 10/3/2018   Little interest or pleasure in doing things Not at all   Feeling down, depressed, irritable, or hopeless Several days   Total Score PHQ 2 1   Trouble falling or staying asleep, or sleeping too much -   Feeling tired or having little energy -   Poor appetite, weight loss, or overeating -   Feeling bad about yourself - or that you are a failure or have let yourself or your family down -   Trouble concentrating on things such as school, work, reading, or watching TV -   Moving or speaking so slowly that other people could have noticed; or the opposite being so fidgety that others notice -   Thoughts of being better off dead, or hurting yourself in some way -   PHQ 9 Score -   How difficult have these problems made it for you to do your work, take care of your home and get along with others -       Comments:     POC UDS was performed in office today    Any new labs or imaging since last appointment? NO    Have you been to an emergency room (ER) or urgent care clinic since your last visit? NO            Have you been hospitalized since your last visit? NO     If yes, where, when, and reason for visit? Have you seen or consulted any other health care providers outside of the [address] [place] [address]  since your last visit? NO     If yes, where, when, and reason for visit? Mr. Leopoldo Garcia has a reminder for a \"due or due soon\" health maintenance. I have asked that he contact his primary care provider for follow-up on this health maintenance.

## 2023-08-16 NOTE — PROGRESS NOTES
HISTORY OF PRESENT ILLNESS  Vignesh Jiménez is a 65 y.o. male. HPI  he returns for follow-up of chronic, severe pain which is widespread and polyarticular and related to generalized osteoarthritis. He also suffers from chronic low back pain related to underlying spondylosis and degenerative disc disease, as well as chronic, daily headaches. He recently underwent vascular testing to assess the possibility of vascular claudication. Testing was equivocal.  An MRI of the lumbosacral spine performed in 2010 was largely unremarkable making his claudicant symptoms unlikely to be related to lumbar stenosis. A trial of Pletal, 50 mg, twice daily will be initiated to assess whether his vascular symptoms can be alleviated. Pain remains under good control, averaging 5 out of 10 with 50% overall relief. Pain level today 5 out of 10, outcome 9/28,(The lower the upper number, the better the outcome)  Physical activity and mobility as well as mood are fair, sleep is good. No reported side effects. A current review of the  does not identify any inconsistency. UDS obtained and reviewed; formal confirmation from laboratory is pending.         Review of Systems   Constitutional: Positive for malaise/fatigue. Negative for chills, fever and weight loss (gain). HENT: Negative for congestion, ear pain and sore throat. Eyes: Positive for blurred vision. Negative for double vision and pain. Respiratory: Positive for shortness of breath (COPD/no changes in 30 years/tolerable/not on oxygen). Negative for cough. Cardiovascular: Negative for chest pain and leg swelling. Gastrointestinal: Positive for abdominal pain, constipation, diarrhea, heartburn and nausea. Negative for blood in stool and vomiting. Genitourinary: Negative. Musculoskeletal: Positive for back pain, joint pain, myalgias and neck pain. Skin: Negative. Neurological: Positive for dizziness (occasional), weakness and headaches.  Negative for tingling and seizures. Endo/Heme/Allergies: Negative for environmental allergies. Psychiatric/Behavioral: Positive for depression. Negative for suicidal ideas. The patient is nervous/anxious (occasional). The patient does not have insomnia. All other systems reviewed and are negative. Physical Exam   Constitutional: He is oriented to person, place, and time. He appears well-developed and well-nourished. No distress. HENT:   Head: Normocephalic. Right Ear: External ear normal.   Left Ear: External ear normal.   Poor dentition   Eyes: Conjunctivae and EOM are normal. Pupils are equal, round, and reactive to light. Neck: Normal range of motion. No thyromegaly present. Painful ROM   Pulmonary/Chest: Effort normal. No respiratory distress. Musculoskeletal: He exhibits tenderness (throughout neck/back/hands/elbows/knees). He exhibits no edema. Right shoulder: He exhibits decreased range of motion, tenderness and pain. Left shoulder: He exhibits decreased range of motion, tenderness and pain. Right elbow: He exhibits normal range of motion (painful extension). Left elbow: He exhibits normal range of motion (painful extension). Right hip: He exhibits decreased range of motion (painful) and tenderness (over TB). Left hip: He exhibits decreased range of motion (painful) and tenderness (over TB). Right knee: He exhibits normal range of motion. Tenderness (crepitus) found. Left knee: He exhibits normal range of motion. Tenderness (crepitus) found. Right ankle: Tenderness. Left ankle: Tenderness. Cervical back: He exhibits tenderness, pain (ROM) and spasm. He exhibits normal range of motion. Thoracic back: He exhibits tenderness, pain and spasm. Lumbar back: He exhibits decreased range of motion, tenderness, pain and spasm.         Back:    Arthritic changes hands  Tight musculature throughout back   Neurological: He is alert and oriented to person, place, and time. No cranial nerve deficit (grossly intact). Gait (antalgic) abnormal.   Skin: Skin is warm and dry. Psychiatric: He has a normal mood and affect. His behavior is normal. Judgment and thought content normal.   Nursing note and vitals reviewed. ASSESSMENT and PLAN  Encounter Diagnoses   Name Primary?  Chronic bilateral low back pain without sciatica Yes    Persistent disorder of initiating or maintaining sleep     Cervicalgia     Pain in joint, multiple sites     Encounter for long-term (current) use of high-risk medication     Chronic pain syndrome     Headache, unspecified headache type     Intermittent claudication (HCC)      Treatment plan as noted above. He will continue on his current analgesic regimen as this is providing excellent pain control with improve functionality and minimal side effects. 3 month reassess him    No concerns are raised for misuse, abuse, or diversion. 1. Pain medications are prescribed with the objective of pain relief and improved physical and psychosocial function in this patient. 2. Counseled patient on proper use of prescribed medications and reviewed opioid contract. 3. Counseled patient about chronic medical conditions and their relationship to anxiety and depression and recommended mental health support as needed. 4. Reviewed with patient self-help tools, home exercise, and lifestyle changes to assist the patient in self-management of symptoms. 5. Advised patient to have a primary care provider to continue care for health maintenance and general medical conditions and support for referral to specialty care as needed. 6. Reviewed with patient the treatment plan, goals of treatment plan, and limitations of treatment plan, to include the potential for side effects from medications and procedures.  If side effects occur, it is the responsibility of the patient to inform the clinic so that a change in the treatment plan can be made in a safe manner. The patient is advised that stopping prescribed medication may cause an increase in symptoms and possible medication withdrawal symptoms. The patient is informed an emergency room evaluation may be necessary if this occurs. DISPOSITION: The patients condition and plan were discussed at length and all questions were answered. The patient agrees with the plan.     Counseling occupied > 50% of visit:  Total time: 40 minutes Quantity Per Injection Site (Units): 14 Quantity Per Injection Site (Units Or Cc): 14